# Patient Record
Sex: MALE | Race: WHITE
[De-identification: names, ages, dates, MRNs, and addresses within clinical notes are randomized per-mention and may not be internally consistent; named-entity substitution may affect disease eponyms.]

---

## 2018-10-16 ENCOUNTER — HOSPITAL ENCOUNTER (EMERGENCY)
Dept: HOSPITAL 25 - UCEAST | Age: 83
Discharge: HOME | End: 2018-10-16
Payer: COMMERCIAL

## 2018-10-16 VITALS — SYSTOLIC BLOOD PRESSURE: 131 MMHG | DIASTOLIC BLOOD PRESSURE: 61 MMHG

## 2018-10-16 DIAGNOSIS — E11.21: ICD-10-CM

## 2018-10-16 DIAGNOSIS — Z87.891: ICD-10-CM

## 2018-10-16 DIAGNOSIS — Z79.84: ICD-10-CM

## 2018-10-16 DIAGNOSIS — I25.10: ICD-10-CM

## 2018-10-16 DIAGNOSIS — H61.23: Primary | ICD-10-CM

## 2018-10-16 DIAGNOSIS — Z79.82: ICD-10-CM

## 2018-10-16 PROCEDURE — G0463 HOSPITAL OUTPT CLINIC VISIT: HCPCS

## 2018-10-16 PROCEDURE — 99213 OFFICE O/P EST LOW 20 MIN: CPT

## 2018-10-16 NOTE — UC
Ear Complaint HPI





- HPI Summary


HPI Summary: 


3 DAYS OF DECREASED HEARING OUT OF RIGHT EAR. NO FEVER OR URI SX. WENT TO [facility] 

[facility] AND WAS TOLD HE HAD CERUMEN BUILD UP BUT THEY WERE UNABLE TO REMOVE IT. 





- History of Current Complaint


Chief Complaint: UCEar


Stated Complaint: DIFFICULTY HEARING


Time Seen by Provider: 10/16/18 08:26


Hx Obtained From: Patient


Onset/Duration: Gradual Onset, Lasting Days, Still Present


Severity Initially: Moderate


Severity Currently: Moderate


Pain Intensity: 0


Pain Scale Used: 0-10 Numeric


Aggravating Factors: Nothing


Alleviating Factors: Nothing


Associated Signs/Symptoms: Positive: Hearing Loss.  Negative: Discharge, URI 

Symptoms





- Allergies/Home Medications


Allergies/Adverse Reactions: 


 Allergies











Allergy/AdvReac Type Severity Reaction Status Date / Time


 


No Known Allergies Allergy   Verified 10/16/18 08:23











Home Medications: 


 Home Medications





Aspirin 325 mg PO DAILY WITH MEAL 10/16/18 [History Confirmed 10/16/18]


Tamsulosin HCl [Flomax] 0.4 mg PO DAILY WITH MEAL 10/16/18 [History Confirmed 10

/16/18]


glipiZIDE [Glipizide] 5 mg PO DAILY WITH MEAL 10/16/18 [History Confirmed 10/16/

18]











PMH/Surg Hx/FS Hx/Imm Hx


Endocrine History: Diabetes


Cardiovascular History: Cardiac Disease





- Surgical History


Surgical History: Yes


Surgery Procedure, Year, and Place: stents





- Family History


Known Family History: 


   Negative: Hypertension





- Social History


Alcohol Use: Rare


Substance Use Type: None


Smoking Status (MU): Former Smoker


When Did the Patient Quit Smoking/Using Tobacco: 10 years





Review of Systems


Constitutional: Negative


ENT: Other - HEARING LOSS RIGHT EAR


Respiratory: Negative


Cardiovascular: Negative


Gastrointestinal: Negative


All Other Systems Reviewed And Are Negative: Yes





Physical Exam


Triage Information Reviewed: Yes


Appearance: Well-Appearing, No Pain Distress, Well-Nourished


Vital Signs: 


 Initial Vital Signs











Temp  97.9 F   10/16/18 08:17


 


Pulse  87   10/16/18 08:17


 


Resp  20   10/16/18 08:17


 


BP  131/61   10/16/18 08:17


 


Pulse Ox  96   10/16/18 08:17











Vital Signs Reviewed: Yes


Eyes: Positive: Conjunctiva Clear


ENT: Positive: TMs normal, Other - BILATERAL EAC WITH CERUMEN IMPACTION


Neck: Positive: Supple


Respiratory: Positive: No respiratory distress, No accessory muscle use


Cardiovascular: Positive: Pulses Normal


Abdomen Description: Positive: Soft


Musculoskeletal: Positive: No Edema


Neurological: Positive: Alert


Psychological: Positive: Age Appropriate Behavior


Skin: Negative: rashes





Ear Complaint Course/Dx





- Course


Course Of Treatment: BILATERAL EAC IRRIGATED SUCCESSFULLY BY RN. TMs CLEAR. PT 

STATES HIS HEARING IS RESTORED.





- Differential Dx/Diagnosis


Provider Diagnoses: BILATERAL CERUMEN IMPACTION





Discharge





- Sign-Out/Discharge


Documenting (check all that apply): Patient Departure


All imaging exams completed and their final reports reviewed: No Studies





- Discharge Plan


Condition: Stable


Disposition: HOME


Patient Education Materials:  Cerumen Impaction (ED)


Referrals: 


Humble Lind MD [Primary Care Provider] - If Needed


Additional Instructions: 


NOW THAT YOUR EAR CANALS ARE CLEAR OF WAX YOU MAY USE A QTIP TO GENTLY AND 

CAREFULLY CLEAN YOUR EARS ONCE OR TWICE A WEEK TO KEEP WAX FROM BUILDING UP. DO 

NOT INSERT THE QTIP ANY FURTHER THAN THE DEPTH OF THE COTTON SWAB.





CONSIDER ENT EVALUATION FOR YOUR HEARING LOSS.





[facility] IN [place]


DR[address] [facility] [address]


[address] [facility]


[phone]





- Billing Disposition and Condition


Condition: STABLE


Disposition: Home

## 2018-11-30 ENCOUNTER — HOSPITAL ENCOUNTER (INPATIENT)
Dept: HOSPITAL 25 - ED | Age: 83
LOS: 5 days | Discharge: HOME HEALTH | DRG: 871 | End: 2018-12-05
Attending: INTERNAL MEDICINE | Admitting: HOSPITALIST
Payer: MEDICARE

## 2018-11-30 DIAGNOSIS — I50.21: ICD-10-CM

## 2018-11-30 DIAGNOSIS — Z95.5: ICD-10-CM

## 2018-11-30 DIAGNOSIS — Z82.49: ICD-10-CM

## 2018-11-30 DIAGNOSIS — Z80.9: ICD-10-CM

## 2018-11-30 DIAGNOSIS — A41.9: Primary | ICD-10-CM

## 2018-11-30 DIAGNOSIS — J18.1: ICD-10-CM

## 2018-11-30 DIAGNOSIS — I13.0: ICD-10-CM

## 2018-11-30 DIAGNOSIS — Z66: ICD-10-CM

## 2018-11-30 DIAGNOSIS — N40.0: ICD-10-CM

## 2018-11-30 DIAGNOSIS — N18.9: ICD-10-CM

## 2018-11-30 DIAGNOSIS — N17.9: ICD-10-CM

## 2018-11-30 DIAGNOSIS — I25.2: ICD-10-CM

## 2018-11-30 DIAGNOSIS — I25.10: ICD-10-CM

## 2018-11-30 DIAGNOSIS — J96.01: ICD-10-CM

## 2018-11-30 DIAGNOSIS — I35.0: ICD-10-CM

## 2018-11-30 DIAGNOSIS — Z79.82: ICD-10-CM

## 2018-11-30 DIAGNOSIS — Z83.3: ICD-10-CM

## 2018-11-30 DIAGNOSIS — E11.9: ICD-10-CM

## 2018-11-30 DIAGNOSIS — E78.5: ICD-10-CM

## 2018-11-30 DIAGNOSIS — I27.20: ICD-10-CM

## 2018-11-30 DIAGNOSIS — Z79.84: ICD-10-CM

## 2018-11-30 DIAGNOSIS — I24.8: ICD-10-CM

## 2018-11-30 DIAGNOSIS — Z87.891: ICD-10-CM

## 2018-11-30 DIAGNOSIS — E83.42: ICD-10-CM

## 2018-11-30 LAB
BASOPHILS # BLD AUTO: 0 10^3/UL (ref 0–0.2)
EOSINOPHIL # BLD AUTO: 0 10^3/UL (ref 0–0.6)
HCT VFR BLD AUTO: 35 % (ref 42–52)
HGB BLD-MCNC: 11.7 G/DL (ref 14–18)
INR PPP/BLD: 1.16 (ref 0.77–1.02)
LYMPHOCYTES # BLD AUTO: 0.9 10^3/UL (ref 1–4.8)
MCH RBC QN AUTO: 31 PG (ref 27–31)
MCHC RBC AUTO-ENTMCNC: 34 G/DL (ref 31–36)
MCV RBC AUTO: 92 FL (ref 80–94)
MONOCYTES # BLD AUTO: 1.2 10^3/UL (ref 0–0.8)
NEUTROPHILS # BLD AUTO: 11.1 10^3/UL (ref 1.5–7.7)
NRBC # BLD AUTO: 0 10^3/UL
NRBC BLD QL AUTO: 0
PLATELET # BLD AUTO: 288 10^3/UL (ref 150–450)
RBC # BLD AUTO: 3.76 10^6/UL (ref 4–5.4)
WBC # BLD AUTO: 13.3 10^3/UL (ref 3.5–10.8)

## 2018-11-30 PROCEDURE — 83880 ASSAY OF NATRIURETIC PEPTIDE: CPT

## 2018-11-30 PROCEDURE — 93005 ELECTROCARDIOGRAM TRACING: CPT

## 2018-11-30 PROCEDURE — 85730 THROMBOPLASTIN TIME PARTIAL: CPT

## 2018-11-30 PROCEDURE — 80053 COMPREHEN METABOLIC PANEL: CPT

## 2018-11-30 PROCEDURE — 81015 MICROSCOPIC EXAM OF URINE: CPT

## 2018-11-30 PROCEDURE — 81003 URINALYSIS AUTO W/O SCOPE: CPT

## 2018-11-30 PROCEDURE — 80061 LIPID PANEL: CPT

## 2018-11-30 PROCEDURE — 84484 ASSAY OF TROPONIN QUANT: CPT

## 2018-11-30 PROCEDURE — 87899 AGENT NOS ASSAY W/OPTIC: CPT

## 2018-11-30 PROCEDURE — 36415 COLL VENOUS BLD VENIPUNCTURE: CPT

## 2018-11-30 PROCEDURE — 83735 ASSAY OF MAGNESIUM: CPT

## 2018-11-30 PROCEDURE — 71046 X-RAY EXAM CHEST 2 VIEWS: CPT

## 2018-11-30 PROCEDURE — 85025 COMPLETE CBC W/AUTO DIFF WBC: CPT

## 2018-11-30 PROCEDURE — 80048 BASIC METABOLIC PNL TOTAL CA: CPT

## 2018-11-30 PROCEDURE — 87086 URINE CULTURE/COLONY COUNT: CPT

## 2018-11-30 PROCEDURE — 93306 TTE W/DOPPLER COMPLETE: CPT

## 2018-11-30 PROCEDURE — 85610 PROTHROMBIN TIME: CPT

## 2018-11-30 PROCEDURE — 87040 BLOOD CULTURE FOR BACTERIA: CPT

## 2018-11-30 PROCEDURE — 99284 EMERGENCY DEPT VISIT MOD MDM: CPT

## 2018-11-30 PROCEDURE — 83605 ASSAY OF LACTIC ACID: CPT

## 2018-11-30 PROCEDURE — 83036 HEMOGLOBIN GLYCOSYLATED A1C: CPT

## 2018-11-30 PROCEDURE — 71045 X-RAY EXAM CHEST 1 VIEW: CPT

## 2018-11-30 RX ADMIN — ATORVASTATIN CALCIUM SCH MG: 10 TABLET, FILM COATED ORAL at 21:05

## 2018-11-30 RX ADMIN — INSULIN LISPRO SCH: 100 INJECTION, SOLUTION INTRAVENOUS; SUBCUTANEOUS at 18:09

## 2018-11-30 RX ADMIN — HEPARIN SODIUM SCH UNITS: 5000 INJECTION INTRAVENOUS; SUBCUTANEOUS at 21:05

## 2018-11-30 NOTE — HP
CC:  Dr. Lind.*

 

HISTORY AND PHYSICAL:

 

DATE OF ADMISSION:  11/30/18

 

PROVIDER:  Deborah Gallo NP

 

PRIMARY CARE PROVIDER:  Dr. Humble Lind.

 

ATTENDING PHYSICIAN WHILE IN THE HOSPITAL:  Dr. Veronica Myers * (dictated by 
Deborah Gallo NP).

 

CHIEF COMPLAINT:

1.  Chest pain.

2.  Fever.

 

HISTORY OF PRESENT ILLNESS:  Mr. Langston is an 86-year-old male with a past 
medical history significant for diabetes, hypertension, hyperlipidemia, MI with 
cardiac stent placement, enlarged prostate, who presented to the emergency room 
with complaints of left-sided chest pain that started yesterday.  The patient 
reports that he had chest pain all night and continued today, so he presented 
to the emergency room for further evaluation.  The patient also reports that he 
has had fevers yesterday, but did not check his temperature.  He does report 
that he has a chronic cough and has been taking guaifenesin for his cough.  He 
reports that he has had this cough for some time.  He states this morning the 
chest pain remained on the left side.  It was sharp.  He denied any nausea, 
vomiting, or diaphoresis with the chest pain.  He does report a decreased 
appetite x2 days.  He says since being in the emergency room the chest pain has 
resolved.  He also reports that he had some abdominal pain this morning as well 
in the mid abdomen that has also resolved.  He does report urinary incontinence 
and frequency, but denies any burning with urination or hematuria. He denies 
any swelling. Denies any focal weakness or sensory loss.  Denies any dysphagia.
  Denies any arthralgias or myalgias.  No rashes or lesions.  Denies any 
anxiety or depression.

 

While in the emergency room, the patient had routine lab work drawn.  He had an 
elevated troponin at 0.12.  He was found to have leukocytosis with WBCs of 
13.3. He had a chest x-ray, left mid lung consolidation, which is felt to be 
pneumonia. Given his fever, leukocytosis, tachycardia, hypotension, we were 
asked to see and evaluate the patient for admission.

 

PAST MEDICAL HISTORY:  Significant for:

1.  Diabetes.

2.  Hypertension.

3.  Hyperlipidemia.

4.  History of an MI with 4 cardiac stent placement.

5.  Enlarged prostate.

 

PAST SURGICAL HISTORY:  Cardiac stents.

 

HOME MEDICATIONS:

1.  Nitroglycerin 0.4 mg sublingual q.5 minutes as needed.

2.  Folic acid 1 mg p.o. daily.

3.  Pravastatin 40 mg p.o. at bedtime.

4.  Vitamin B12 1000 mcg p.o. daily.

5.  Glipizide 5 mg p.o. q.a.m.

6.  Tamsulosin 0.8 mg p.o. daily.

7.  Aspirin 325 to 650 mg p.o. q.4 hours as needed.

8.  Guaifenesin 400 mg p.o. 4 times daily p.r.n. cough.

9.  Lisinopril 5 mg p.o. daily.

 

ALLERGIES:  No known drug allergies.

 

FAMILY HISTORY:  No reported history of coronary artery disease or diabetes 
within the family.  He had 2 brothers with cancer, unknown type.

 

SOCIAL HISTORY:  The patient reports that he quit smoking cigarettes 
approximately 18 years ago.  Does report occasional alcohol use.  Denies any 
illicit drug use. He is retired.  He lives alone.  He is .  Surrogate 
decision maker in the event he is unable to make his own decisions is his son, 
Zay.  His number is [phone].  The patient wishes to be a DNR/DNI.  The 
MOLST form has been completed and placed on the chart.

 

REVIEW OF SYSTEMS:  The patient is febrile with a T-max of 101.6.  He does 
report chest pain that was left-sided that started yesterday, has now resolved 
since being in the emergency room.  He denies any edema.  Does report decreased 
appetite x2 days.  Reports a chronic cough, no worse than normal.  Denies any 
hemoptysis. Denies shortness of breath, nocturnal dyspnea, or orthopnea.  
Denies any nausea, vomiting, or diarrhea.  He did report some mid abdominal 
pain that since has resolved.  Denies any gross hematuria or dysuria.  No focal 
weakness or sensory loss.  Denies any visual complaints, dysphagia.  Denies any 
arthralgias or myalgias.  No rashes or lesions.  No psychosis or anxiety.  The 
patient does report that he was incontinent of urine and has increased urinary 
frequency.

 

                               PHYSICAL EXAMINATION

 

GENERAL:  At this time, Mr. Langston is sitting on the stretcher in the emergency 
room.  He is alert and oriented.  He does not appear to be in any acute 
distress.

 

VITAL SIGNS:  Temperature is 99.9, heart rate is 95, respirations are 20, O2 
saturation is 93% on 2 L, blood pressure 107/49.

 

HEENT:  Head is atraumatic, normocephalic.  Eyes:  EOMs are intact.  Sclerae 
anicteric and not pale.  Oral mucosa appeared to be moist.

 

NECK:  Supple.

 

LUNGS:  Diminished bilaterally.  No wheezes.  A few scattered rhonchi.

 

CARDIAC:  S1, S2.  Regular rate and rhythm.  No murmurs, rubs, or gallops.

 

ABDOMEN:  Soft and nontender.  Bowel sounds are present x4.

 

EXTREMITIES:  Pulses are +2 bilaterally.  There is no edema.  He is able to 
move all 4 extremities with 5/5 strength.

 

NEUROLOGIC:  He is awake, alert, and oriented x3.  Speech is clear.  Thought 
process is intact.  There are no gross focal deficits.

 

SKIN:  Intact.

 

DIAGNOSTIC STUDIES/LAB DATA:  WBCs were 13.3, RBCs 3.76, hemoglobin 11.7, 
hematocrit 35, platelet count 288, neutrophils were 83.7, lymphs were 6.9, 
absolute neutrophils were 11.1.  INR was 1.16, APTT was 30.4.  Sodium 135, 
potassium 4.7, chloride 103, carbon dioxide was 26, anion gap was 6, BUN was 32
, creatinine 1.93, glucose was 183, lactic acid was 1.1, calcium 9.4, magnesium 
1.7.  T-bili 1.10, ASTs were 12, ALTs were 11, alkaline phosphatase 101. 
Troponin was 0.12, BNP was 385.  He had a flu A and B, which were negative.

 

He had a chest x-ray.  Radiologist's impression:  Left mid lung consolidation, 
recommend followup until resolution to exclude underlying pulmonary parenchymal 
pathology.  If the clinical presentation is not consistent pneumonia, consider 
further evaluation with a CT of the chest.

 

He had an EKG that showed sinus rhythm with PACs at a rate of 101.

 

ASSESSMENT AND PLAN:  Mr. Langston is an 86-year-old male, who presented to the 
emergency room today with complaints of left-sided chest pain.  He was 
evaluated and found to have pneumonia, meeting sepsis criteria.  We were asked 
to evaluate him for admission.  He will be admitted inpatient for:

 

1.  Sepsis.  He meets sepsis criteria with fever, tachycardia, hypotension, 
leukocytosis, acute kidney injury with known source of infection, pneumonia.  
He did receive 2 L of IV fluids in the emergency room.  He does have an 
elevated BNP. He did receive azithromycin and ceftriaxone in the emergency 
room.  His blood cultures are pending.  UA has been ordered and pending.  A 
urine for Legionella and S. pneumoniae is pending.  His lactic acid was 
negative.  We will continue to monitor him and treat with IV antibiotics.

2.  Pneumonia.  I will continue azithromycin and ceftriaxone.  We will provide 
oxygen support as needed.  Blood cultures are currently pending.  Urine for 
Legionella and Strep pneumoniae currently pending.  A sputum culture has been 
ordered.

3.  Elevated troponin.  The patient did have chest pain.  This started last 
p.m. It has since subsided with IV hydration and aspirin and 1 nitro.  We will 
continue to trend his troponins.  His initial troponin was 0.12.  The patient 
did receive aspirin 324 in the ambulance.  He is already on statin therapy.  We 
will continue his statin therapy.  The patient at this time does not want any 
further evaluation of his elevated troponin.  He does not want a stress test if 
that would be needed. I suspect that his elevation in troponin could be related 
to demand ischemia given his underlying pneumonia, but given his history of 
coronary artery disease with stent placement, acute coronary syndrome is also 
within the differential.  He does have flat T waves in v5 and v6 and q waves  
on his current EKG which showed sinus rhythm with PACs.  There is no old EKG to 
compare, will repeat Ekg in the AM or with further episodes of chest pain.  I 
will hold on beta blocker at this time do to sepsis. Will continue to monitor 
on telemetry. 

4.  Acute kidney injury.  The patient has an elevated BUN and creatinine at 32 
and 1.93.  I suspect this could be related to dehydration as the patient has 
had poor p.o. intake for 2 days.  He received IV hydration in the emergency 
room.  We will repeat a BMP in the a.m. and continue to monitor this.

5.  Hypomagnesemia.  The patient's magnesium was 1.7 on arrival.  I ordered 
magnesium 2 g IV.  We will repeat a mag level in the a.m.

6.  Hypertension.  The patient is currently on lisinopril.  I am going to hold 
his lisinopril due to his acute kidney injury.  We will monitor his blood 
pressure and treat as needed.

7.  Hyperlipidemia.  We will continue on his statin therapy as previous 
prescribed.

8.  Diabetes.  I will do Accu-Cheks a.c. with lispro sliding scale.  I am going 
to hold his glipizide at this time.

9.  FEN:  He can have heart-healthy, decaf okay diet.

10.  Code status:  He is a DNR/DNI. MOLST completed

11.  DVT prophylaxis:  I will place him on heparin subcu.

12.  Disposition:  He will be placed inpatient on telemetry. 

 

TIME SPENT:  Time spent on this admission was greater than 60 minutes, half 
that time was spent face-to-face with the patient and his family obtaining my 
history and physical, the other half of the time was spent going over my plan 
of care and implementing my plan of care.

 

I have discussed this with my attending, Dr. Veronica Myers, and she is in 
agreement with my plan.

 

 ____________________________________ DEBORAH GALLO, NP

 

[id number] #: [id number]

SAAD

## 2018-11-30 NOTE — ED
HPI Chest Pain





- HPI Summary


HPI Summary: 





An 87 y/o male brought in by [facility] ambulance presents to [facility] with a chief 

complaint of chest pain since 11/29/18. The patient claims that he is not 

currently having CP and rates his pain as 0/10 but states that his chest pain 

was continuous. He was given 4 NTG in the ambulance and took NTG at home prior 

to the ambulance but does not believe his CP was relieved because of NTG. He 

also c/o abd pain and productive cough. He has a Hx of MI and has had "4-5 

stents". He denies a Hx of DM, HTN or blood clots in lungs or legs. He also 

suffered from urinary incontinence the night of 11/2918. He reports not wearing 

O2 at home.





- History of Current Complaint


Chief Complaint: EDChestPainROMI


Time Seen by Provider: 11/30/18 11:58


Hx Obtained From: Patient, Family/Caretaker, EMS


Onset/Duration: Started Hours Ago, Resolved


Timing: Constant


Initial Severity: Moderate


Current Severity: Mild


Pain Intensity: 0


Pain Scale Used: 0-10 Numeric


Chest Pain Location: Diffuse


Chest Pain Radiates: No





- Allergy/Home Medications


Allergies/Adverse Reactions: 


 Allergies











Allergy/AdvReac Type Severity Reaction Status Date / Time


 


No Known Allergies Allergy   Verified 10/16/18 08:23











Home Medications: 


 Home Medications





Aspirin [Ecotrin] 325 - 650 mg PO Q4HR PRN 11/30/18 [History Confirmed 11/30/18]


Cyanocobalamin TAB* [Vitamin B12 TAB*] 1,000 mcg PO DAILY 11/30/18 [History 

Confirmed 11/30/18]


Folic Acid TAB* [Folvite TAB*] 1 mg PO DAILY 11/30/18 [History Confirmed 11/30/ 18]


Lisinopril TAB* [Prinivil TAB*] 5 mg PO DAILY 11/30/18 [History Confirmed 11/30/ 18]


Nitroglycerin TAB 0.4 MG* 0.4 mg SL Q5M PRN 11/30/18 [History Confirmed 11/30/18

]


Pravastatin (NF) [Pravachol (NF)] 40 mg PO BEDTIME 11/30/18 [History Confirmed 

11/30/18]


Tamsulosin CAP* [Flomax CAP*] 0.8 mg PO DAILY 11/30/18 [History Confirmed 11/30/ 18]


glipiZIDE TAB* [Glucotrol TAB*] 5 mg PO QAM 11/30/18 [History Confirmed 11/30/18

]


guaiFENesin [Mucus Relief] 400 mg PO QID PRN 11/30/18 [History Confirmed 11/30/ 18]











PMH/Surg Hx/FS Hx/Imm Hx


Endocrine/Hematology History: Reports: Hx Diabetes - typeII


   Denies: Hx Thyroid Disease


Cardiovascular History: 


   Denies: Hx Hypertension


Respiratory History: 


   Denies: Hx Asthma


GI History: 


   Denies: Hx Ulcer





- Surgical History


Surgery Procedure, Year, and Place: stents


Infectious Disease History: No


Infectious Disease History: 


   Denies: Hx Hepatitis, Hx Human Immunodeficiency Virus (HIV), Traveled 

Outside the [place] in Last 30 Days





- Family History


Known Family History: 


   Negative: Hypertension





- Social History


Alcohol Use: Rare


Substance Use Type: Reports: None


Hx Tobacco Use: Yes


Smoking Status (MU): Former Smoker





Review of Systems


Cardiovascular: Negative - blood clots in lungs or legs


Positive: Chest Pain


Positive: Abdominal Pain


Positive: incontinence


All Other Systems Reviewed And Are Negative: Yes





Physical Exam





- Summary


Physical Exam Summary: 





GENERAL: Patient is a well-developed and nourished F who is lying comfortable 

in the stretcher. Patient is not in any acute respiratory distress.


HEAD AND FACE: Normocephalic


EYES: PERRLA, EOMI x 2.


EARS: Hearing grossly intact.


MOUTH: Oropharynx within normal limits.


NECK: Supple, trachea is midline, no adenopathy, no JVD, no carotid bruit.


CHEST: Symmetric, no tenderness at palpation


LUNGS: Wheezing in left anterior, decreased breath sounds in bases bilaterally.


CVS: Regular rate and rhythm, S1 and S2 present, no murmurs or gallops 

appreciated.


ABDOMEN: Soft, non-tender. Bowel sounds are normal. No abdominal abnormal 

pulsations.


EXTREMITIES: Full ROM in all major joints, no edema, no cyanosis or clubbing.


NEURO: Alert and oriented x 3. No acute neurological deficits. Speech is normal 

and follows commands.


SKIN: Dry and warm


Triage Information Reviewed: Yes


Vital Signs On Initial Exam: 


 Initial Vitals











Temp Pulse Resp BP Pulse Ox


 


 101.6 F   103   17   112/51   96 


 


 11/30/18 11:54  11/30/18 11:54  11/30/18 11:54  11/30/18 11:54  11/30/18 11:54











Vital Signs Reviewed: Yes





Diagnostics





- Vital Signs


 Vital Signs











  Temp Pulse Resp BP Pulse Ox


 


 11/30/18 11:54  101.6 F  103  17  112/51  96














- Laboratory


Result Diagrams: 


 12/01/18 06:01





 12/01/18 06:01


Lab Statement: Any lab studies that have been ordered have been reviewed, and 

results considered in the medical decision making process.





- Radiology


  ** CXR


Radiology Interpretation Completed By: Radiologist


Summary of Radiographic Findings: LEFT MIDLUNG CONSOLIDATION. RECOMMEND FOLLOW-

UP UNTIL RESOLUTION TO EXCLUDE.  UNDERLYING PULMONARY PARENCHYMAL PATHOLOGY. IF 

THE CLINICAL PRESENTATION IS NOT CONSISTENT.  WITH PNEUMONIA, CONSIDER FURTHER 

EVALUATION WITH CT OF THE CHEST.  ED physician has reviewed this imaging report.





- EKG


  ** 12:24


Cardiac Rate: Tachycardia - 101 bpm


EKG Rhythm: Sinus Tachycardia


Summary of EKG Findings: sinus arrhythmia, some flattening of T-waves in 

lateral leads.





Re-Evaluation





- Re-Evaluation


  ** First Eval


Re-Evaluation Time: 13:35


Change: Unchanged


Comment: Convinced patient for admission





Chest Pain Course/Dx





- Course


Course Of Treatment: An 87 y/o male brought in by [facility] ambulance presents to 

[facility] with a chief complaint of chest pain since 11/29/18. Workup is 

remarkable. Patient will be admitted. CXR impression:  LEFT MIDLUNG 

CONSOLIDATION. RECOMMEND FOLLOW-UP UNTIL RESOLUTION TO EXCLUDE.  UNDERLYING 

PULMONARY PARENCHYMAL PATHOLOGY. IF THE CLINICAL PRESENTATION IS NOT 

CONSISTENT.  WITH PNEUMONIA, CONSIDER FURTHER EVALUATION WITH CT OF THE CHEST. 

Dx: PNA, acute kidney injury. Rule out ACS. Case discussed with hospitalist. I 

discussed results with patient. The patient agrees with this plan.





- Diagnoses


Provider Diagnoses: 


 PNA (pneumonia), Acute kidney injury








- Provider Notifications


Discussed Care Of Patient With: Veronica Myers


Time Discussed With Above Provider: 13:45


Instructed by Provider To: Admit As Inpatient





Discharge





- Sign-Out/Discharge


Documenting (check all that apply): Patient Departure - Admit





- Discharge Plan


Condition: Fair


Disposition: ADMITTED TO [facility]





- Billing Disposition and Condition


Condition: FAIR


Disposition: Admitted to [facility]





- Attestation Statements


Document Initiated by Scribe: Yes


Documenting Scribe: Tonny Sexton


Provider For Whom Scribe is Documenting (Include Credential): MD Ramiro Delatorreibcarlos Attestation: 


Tonny NGUYEN, scribed for Mariela Peña MD on 12/02/18 at 0156. 


Scribe Documentation Reviewed: Yes


Provider Attestation: 


The documentation as recorded by the scribe, Tonny Sexton accurately 

reflects the service I personally performed and the decisions made by me, Deandre Peña MD


Status of Scribe Document: Viewed

## 2018-12-01 LAB
BASOPHILS # BLD AUTO: 0 10^3/UL (ref 0–0.2)
EOSINOPHIL # BLD AUTO: 0 10^3/UL (ref 0–0.6)
HCT VFR BLD AUTO: 33 % (ref 42–52)
HGB BLD-MCNC: 10.9 G/DL (ref 14–18)
LYMPHOCYTES # BLD AUTO: 1 10^3/UL (ref 1–4.8)
MCH RBC QN AUTO: 31 PG (ref 27–31)
MCHC RBC AUTO-ENTMCNC: 33 G/DL (ref 31–36)
MCV RBC AUTO: 92 FL (ref 80–94)
MONOCYTES # BLD AUTO: 1.1 10^3/UL (ref 0–0.8)
NEUTROPHILS # BLD AUTO: 10.3 10^3/UL (ref 1.5–7.7)
NRBC # BLD AUTO: 0 10^3/UL
NRBC BLD QL AUTO: 0.3
PLATELET # BLD AUTO: 282 10^3/UL (ref 150–450)
RBC # BLD AUTO: 3.56 10^6/UL (ref 4–5.4)
RBC UR QL AUTO: (no result)
WBC # BLD AUTO: 12.6 10^3/UL (ref 3.5–10.8)
WBC UR QL AUTO: (no result)

## 2018-12-01 RX ADMIN — METOPROLOL TARTRATE SCH MG: 25 TABLET, FILM COATED ORAL at 20:50

## 2018-12-01 RX ADMIN — INSULIN LISPRO SCH: 100 INJECTION, SOLUTION INTRAVENOUS; SUBCUTANEOUS at 17:25

## 2018-12-01 RX ADMIN — TAMSULOSIN HYDROCHLORIDE SCH MG: 0.4 CAPSULE ORAL at 09:37

## 2018-12-01 RX ADMIN — CEFTRIAXONE SODIUM SCH MLS/HR: 1 INJECTION, POWDER, FOR SOLUTION INTRAVENOUS at 14:59

## 2018-12-01 RX ADMIN — CYANOCOBALAMIN TAB 500 MCG SCH MCG: 500 TAB at 09:38

## 2018-12-01 RX ADMIN — INSULIN LISPRO SCH: 100 INJECTION, SOLUTION INTRAVENOUS; SUBCUTANEOUS at 14:13

## 2018-12-01 RX ADMIN — INSULIN LISPRO SCH: 100 INJECTION, SOLUTION INTRAVENOUS; SUBCUTANEOUS at 09:09

## 2018-12-01 RX ADMIN — HEPARIN SODIUM SCH UNITS: 5000 INJECTION INTRAVENOUS; SUBCUTANEOUS at 14:59

## 2018-12-01 RX ADMIN — HEPARIN SODIUM SCH UNITS: 5000 INJECTION INTRAVENOUS; SUBCUTANEOUS at 05:50

## 2018-12-01 RX ADMIN — ASPIRIN SCH MG: 81 TABLET, COATED ORAL at 09:37

## 2018-12-01 RX ADMIN — ATORVASTATIN CALCIUM SCH MG: 10 TABLET, FILM COATED ORAL at 20:50

## 2018-12-01 RX ADMIN — FOLIC ACID SCH MG: 1 TABLET ORAL at 09:38

## 2018-12-01 RX ADMIN — HEPARIN SODIUM SCH UNITS: 5000 INJECTION INTRAVENOUS; SUBCUTANEOUS at 20:50

## 2018-12-01 NOTE — PN
Subjective


Date of Service: 12/01/18


Interval History: 








Pt attest to chronic SOUZA and chest pains likely angina related but says he is 

87 yo and still wants to limit/refuse any additional cardiac workup such as a 

stress test. Denies current cardiologist. Says an exercise stress test after 

his 2002 Stents with Dr. Mo was somewhat traumatic. Even explaining nuclear 

stress or ECHO would be less invasive he refuses. Walks 200 yds to his mail box 

and back without having to stop. 


Sees Dr. Lind every 6 months. Thinks he may have mentioned some degree of 

kidney disease. 


Still on 4L 


Tmax 100.0 this AM 


Sees no specialist














Objective


Active Medications: 








Acetaminophen (Tylenol Tab*)  650 mg PO Q4H PRN


   PRN Reason: FEVER/PAIN


Al Hydrox/Mg Hydrox/Simethicone (Maalox Plus*)  30 ml PO Q6H PRN


   PRN Reason: INDIGESTION


Albuterol (Ventolin 2.5 Mg/3 Ml Neb.Sol*)  2.5 mg INH [id number] AWAKE PRN


   PRN Reason: sob/wheezing


Aspirin (Aspirin Ec Tab*)  81 mg PO DAILY [facility]


   Last Admin: 12/01/18 09:37 Dose:  81 mg


Atorvastatin Calcium (Lipitor*)  10 mg PO BEDTIME [facility]; Protocol


   Last Admin: 11/30/18 21:05 Dose:  10 mg


Cyanocobalamin (Vitamin B12 Tab*)  1,000 mcg PO DAILY [facility]


   Last Admin: 12/01/18 09:38 Dose:  1,000 mcg


Dextrose (D50w Syringe 50 Ml*)  12.5 gm IV PUSH .FOR FS < 60 - SS PRN


   PRN Reason: FS < 60


Folic Acid (Folvite Tab*)  1 mg PO DAILY [facility]


   Last Admin: 12/01/18 09:38 Dose:  1 mg


Guaifenesin (Robitussin*)  20 ml PO QID PRN


   PRN Reason: CONGESTION


Heparin Sodium (Porcine) (Heparin Vial(*))  5,000 units SUBCUT Q8HR [facility]


   Last Admin: 12/01/18 14:59 Dose:  5,000 units


Ceftriaxone Sodium 1 gm/ (Sodium Chloride)  50 mls @ 200 mls/hr IVPB Q24H [facility]


   Last Admin: 12/01/18 14:59 Dose:  200 mls/hr


Azithromycin 500 mg/ Sodium (Chloride)  250 mls @ 250 mls/hr IVPB Q24H [facility]


   Last Admin: 12/01/18 16:27 Dose:  250 mls/hr


Insulin Human Lispro (Humalog*)  0 units SUBCUT AC [facility]; Protocol


   Last Admin: 12/01/18 14:13 Dose:  Not Given


Nitroglycerin (Nitroglycerin Tab 0.4 Mg*)  0.4 mg SL Q5M PRN


   PRN Reason: PAIN - CHEST


Tamsulosin HCl (Flomax Cap*)  0.8 mg PO DAILY [facility]


   Last Admin: 12/01/18 09:37 Dose:  0.8 mg








 Vital Signs - 8 hr











  12/01/18 12/01/18





  11:21 16:13


 


Temperature 100.0 F 98.6 F


 


Pulse Rate 105 104


 


Respiratory 22 20





Rate  


 


Blood Pressure 133/70 122/49





(mmHg)  


 


O2 Sat by Pulse 95 100





Oximetry  











Oxygen Devices in Use Now: Nasal Cannula


Appearance: NAD


Eyes: No Scleral Icterus, PERRLA


Ears/Nose/Mouth/Throat: NL Teeth, Lips, Gums, Mucous Membranes Moist


Neck: NL Appearance and Movements; NL JVP


Respiratory: - - rhonchi in left mid lung field, no wheezing or rales. 


Cardiovascular: - - holosystolic murmur 2/6 loudest at LLSB, regular rate, no 

rubs or gallops


Extremities: No Edema


Skin: No Rash or Ulcers, No Nodules or Sclerosis


Neurological: Alert and Oriented x 3, NL Sensation, NL Muscle Strength and Tone


Nutrition: Taking PO's


Result Diagrams: 


 12/01/18 06:01





 12/01/18 06:01


Additional Lab and Data: 





 Laboratory Results - last 24 hr











  11/30/18 12/01/18 12/01/18





  18:50 01:55 06:01


 


WBC    12.6 H


 


RBC    3.56 L


 


Hgb    10.9 L


 


Hct    33 L


 


MCV    92


 


MCH    31


 


MCHC    33


 


RDW    14


 


Plt Count    282


 


MPV    6.8 L


 


Neut % (Auto)    82.2


 


Lymph % (Auto)    8.3


 


Mono % (Auto)    9.1


 


Eos % (Auto)    0


 


Baso % (Auto)    0.4


 


Absolute Neuts (auto)    10.3 H


 


Absolute Lymphs (auto)    1.0


 


Absolute Monos (auto)    1.1 H


 


Absolute Eos (auto)    0


 


Absolute Basos (auto)    0


 


Absolute Nucleated RBC    0


 


Nucleated RBC %    0.3


 


Sodium   


 


Potassium   


 


Chloride   


 


Carbon Dioxide   


 


Anion Gap   


 


BUN   


 


Creatinine   


 


Est GFR ( Amer)   


 


Est GFR (Non-Af Amer)   


 


BUN/Creatinine Ratio   


 


Glucose   


 


POC Glucose (mg/dL)   


 


Calcium   


 


Magnesium   


 


Troponin I  0.09 H*  


 


Triglycerides   


 


Cholesterol   


 


LDL Cholesterol   


 


HDL Cholesterol   


 


Urine Color   Yellow 


 


Urine Appearance   Cloudy 


 


Urine pH   5.0 


 


Ur Specific Gravity   1.014 


 


Urine Protein   Negative 


 


Urine Ketones   Trace A 


 


Urine Blood   2+ A 


 


Urine Nitrate   Negative 


 


Urine Bilirubin   Negative 


 


Urine Urobilinogen   Negative 


 


Ur Leukocyte Esterase   Trace A 


 


Urine WBC (Auto)   2+(11-20/hpf) A 


 


Urine RBC (Auto)   2+(6-10/hpf) A 


 


Ur Squamous Epith Cells   Present A 


 


Urine Bacteria   Absent 


 


Urine Glucose   Negative 


 


Urine Ascorbic Acid   * A 














  12/01/18 12/01/18 12/01/18





  06:01 07:28 11:35


 


WBC   


 


RBC   


 


Hgb   


 


Hct   


 


MCV   


 


MCH   


 


MCHC   


 


RDW   


 


Plt Count   


 


MPV   


 


Neut % (Auto)   


 


Lymph % (Auto)   


 


Mono % (Auto)   


 


Eos % (Auto)   


 


Baso % (Auto)   


 


Absolute Neuts (auto)   


 


Absolute Lymphs (auto)   


 


Absolute Monos (auto)   


 


Absolute Eos (auto)   


 


Absolute Basos (auto)   


 


Absolute Nucleated RBC   


 


Nucleated RBC %   


 


Sodium  135  


 


Potassium  4.7  


 


Chloride  104  


 


Carbon Dioxide  23  


 


Anion Gap  8  


 


BUN  33 H  


 


Creatinine  1.76 H  


 


Est GFR ( Amer)  44.6  


 


Est GFR (Non-Af Amer)  36.9  


 


BUN/Creatinine Ratio  18.8  


 


Glucose  179 H  


 


POC Glucose (mg/dL)   172 H  202 H


 


Calcium  8.8  


 


Magnesium  2.0  


 


Troponin I   


 


Triglycerides  138  


 


Cholesterol  132  


 


LDL Cholesterol  71  


 


HDL Cholesterol  33.4  


 


Urine Color   


 


Urine Appearance   


 


Urine pH   


 


Ur Specific Gravity   


 


Urine Protein   


 


Urine Ketones   


 


Urine Blood   


 


Urine Nitrate   


 


Urine Bilirubin   


 


Urine Urobilinogen   


 


Ur Leukocyte Esterase   


 


Urine WBC (Auto)   


 


Urine RBC (Auto)   


 


Ur Squamous Epith Cells   


 


Urine Bacteria   


 


Urine Glucose   


 


Urine Ascorbic Acid   














  12/01/18





  16:32


 


WBC 


 


RBC 


 


Hgb 


 


Hct 


 


MCV 


 


MCH 


 


MCHC 


 


RDW 


 


Plt Count 


 


MPV 


 


Neut % (Auto) 


 


Lymph % (Auto) 


 


Mono % (Auto) 


 


Eos % (Auto) 


 


Baso % (Auto) 


 


Absolute Neuts (auto) 


 


Absolute Lymphs (auto) 


 


Absolute Monos (auto) 


 


Absolute Eos (auto) 


 


Absolute Basos (auto) 


 


Absolute Nucleated RBC 


 


Nucleated RBC % 


 


Sodium 


 


Potassium 


 


Chloride 


 


Carbon Dioxide 


 


Anion Gap 


 


BUN 


 


Creatinine 


 


Est GFR ( Amer) 


 


Est GFR (Non-Af Amer) 


 


BUN/Creatinine Ratio 


 


Glucose 


 


POC Glucose (mg/dL)  205 H


 


Calcium 


 


Magnesium 


 


Troponin I 


 


Triglycerides 


 


Cholesterol 


 


LDL Cholesterol 


 


HDL Cholesterol 


 


Urine Color 


 


Urine Appearance 


 


Urine pH 


 


Ur Specific Gravity 


 


Urine Protein 


 


Urine Ketones 


 


Urine Blood 


 


Urine Nitrate 


 


Urine Bilirubin 


 


Urine Urobilinogen 


 


Ur Leukocyte Esterase 


 


Urine WBC (Auto) 


 


Urine RBC (Auto) 


 


Ur Squamous Epith Cells 


 


Urine Bacteria 


 


Urine Glucose 


 


Urine Ascorbic Acid 











Microbiology and Other Data: 











 Microbiology





11/30/18 12:45   Blood Venous   Aerobic Blood Culture - Preliminary


                            No Growth Day 1


11/30/18 12:45   Blood Venous   Anaerobic Blood Culture - Preliminary


                            No Growth Day 1


12/01/18 01:55   Urine   Legionella Urinary Antigen - Final


                            Negative Legionella Antigen


12/01/18 01:55   Urine   Streptococcus pneumoniae Ag Screen - Final


                            Negative S. pneumo Antigen


11/30/18 13:03   Nasal   Influenza Types A,B Antigen - Final


                            Specimen received for Influenza A/B Molecular 

testing

















Assess/Plan/Problems-Billing


Assessment: 





87 yo male PMH CAD with 4 stents with 2002 [facility] documenting subtotal LAD 

occlusion, NIDDM, HTHN, HLD with chronic SOUZA and exertional chest pain but 

refusing additional cardiac workup presenting with left chest pain and 

suprapubic pain. left midlung consolidation, fever, leukocytosis, tachycardia, 

tachypnea suspicious for sepsis secondary to pneumonia. Acute hypoxic 

respiratory failure. CFTX, azithromycin. 





- Patient Problems


(1) Sepsis


Current Visit: Yes   Status: Acute   Comment: fever, tachycardia, leukocytosis, 

tachpynea, acute hypoxic respiratory failure secondary to left middle lobe 

pneumonia. 


fever curve, HR, RR improving, still with significant oxygen requirement. 


   





(2) Pneumonia


Current Visit: Yes   Status: Acute   Code(s): J18.9 - PNEUMONIA, UNSPECIFIED 

ORGANISM   SNOMED Code(s): 543822955


   Comment: Continue ceftriaxone, azithromycin


strep pna and legionella urine Ag negative. 


f/u Bcx, sputm cx


flu negative.    





(3) CAD (coronary artery disease)


Current Visit: Yes   Status: Acute   Code(s): I25.10 - ATHSCL HEART DISEASE OF 

NATIVE CORONARY ARTERY W/O ANG PCTRS   SNOMED Code(s): 21664884


   Comment: reported 4 stents


continue nitro SL prn. 


he is not on a BB at home. will start metoprolol 12.5mg q12 for now given 

resolving sepsis


former smoker (quit 18 years ago)


refusing nuclear stress or ECHO. EKG with inferior Q wave in III and v5 ST 

depressions and TWI II   





(4) HLD (hyperlipidemia)


Current Visit: Yes   Status: Acute   Code(s): E78.5 - HYPERLIPIDEMIA, 

UNSPECIFIED   SNOMED Code(s): 50859957


   Comment: Continue 10mg atorvastatin. (home is pravastatin 40mg)


HDL 71, 


HDL 33   





(5) Kidney disease


Current Visit: Yes   Status: Acute   Comment: Unknown baseline, but likely 

degree of chronic kidney disease. 


CRT improved 1.93 ->1.76 with IVF. 


Plan to obtain records from Dr. Lind if still here on Monday.    


Status and Disposition: 





medicine inpatient.

## 2018-12-02 LAB
BASOPHILS # BLD AUTO: 0 10^3/UL (ref 0–0.2)
EOSINOPHIL # BLD AUTO: 0 10^3/UL (ref 0–0.6)
HCT VFR BLD AUTO: 33 % (ref 42–52)
HGB BLD-MCNC: 11.1 G/DL (ref 14–18)
LYMPHOCYTES # BLD AUTO: 0.7 10^3/UL (ref 1–4.8)
MCH RBC QN AUTO: 31 PG (ref 27–31)
MCHC RBC AUTO-ENTMCNC: 34 G/DL (ref 31–36)
MCV RBC AUTO: 92 FL (ref 80–94)
MONOCYTES # BLD AUTO: 1.1 10^3/UL (ref 0–0.8)
NEUTROPHILS # BLD AUTO: 8.1 10^3/UL (ref 1.5–7.7)
NRBC # BLD AUTO: 0 10^3/UL
NRBC BLD QL AUTO: 0.1
PLATELET # BLD AUTO: 338 10^3/UL (ref 150–450)
RBC # BLD AUTO: 3.61 10^6/UL (ref 4–5.4)
WBC # BLD AUTO: 10 10^3/UL (ref 3.5–10.8)

## 2018-12-02 RX ADMIN — INSULIN LISPRO SCH UNITS: 100 INJECTION, SOLUTION INTRAVENOUS; SUBCUTANEOUS at 13:07

## 2018-12-02 RX ADMIN — HEPARIN SODIUM SCH UNITS: 5000 INJECTION INTRAVENOUS; SUBCUTANEOUS at 13:55

## 2018-12-02 RX ADMIN — INSULIN GLARGINE SCH UNITS: 100 INJECTION, SOLUTION SUBCUTANEOUS at 17:16

## 2018-12-02 RX ADMIN — INSULIN LISPRO SCH UNITS: 100 INJECTION, SOLUTION INTRAVENOUS; SUBCUTANEOUS at 08:41

## 2018-12-02 RX ADMIN — HEPARIN SODIUM SCH UNITS: 5000 INJECTION INTRAVENOUS; SUBCUTANEOUS at 05:59

## 2018-12-02 RX ADMIN — CEFTRIAXONE SODIUM SCH MLS/HR: 1 INJECTION, POWDER, FOR SOLUTION INTRAVENOUS at 13:55

## 2018-12-02 RX ADMIN — FUROSEMIDE SCH MG: 10 INJECTION, SOLUTION INTRAMUSCULAR; INTRAVENOUS at 15:37

## 2018-12-02 RX ADMIN — TAMSULOSIN HYDROCHLORIDE SCH MG: 0.4 CAPSULE ORAL at 08:42

## 2018-12-02 RX ADMIN — FOLIC ACID SCH MG: 1 TABLET ORAL at 08:42

## 2018-12-02 RX ADMIN — METOPROLOL TARTRATE SCH MG: 25 TABLET, FILM COATED ORAL at 08:41

## 2018-12-02 RX ADMIN — INSULIN LISPRO SCH UNITS: 100 INJECTION, SOLUTION INTRAVENOUS; SUBCUTANEOUS at 17:15

## 2018-12-02 RX ADMIN — CYANOCOBALAMIN TAB 500 MCG SCH MCG: 500 TAB at 08:41

## 2018-12-02 RX ADMIN — ATORVASTATIN CALCIUM SCH MG: 80 TABLET, FILM COATED ORAL at 21:12

## 2018-12-02 RX ADMIN — HEPARIN SODIUM SCH UNITS: 5000 INJECTION INTRAVENOUS; SUBCUTANEOUS at 21:12

## 2018-12-02 RX ADMIN — ASPIRIN SCH MG: 81 TABLET, COATED ORAL at 08:42

## 2018-12-02 RX ADMIN — METOPROLOL TARTRATE SCH MG: 25 TABLET, FILM COATED ORAL at 21:09

## 2018-12-02 NOTE — PN
Subjective


Date of Service: 12/02/18


Interval History: 








Family at bedside, questions answered. 


Currently on 5L, 98%


coughing still, unable to produce sputum


feeling more comfortable than last night when had episode of respiratory 

distress, got another 20mg IV lasix. 


weight decreasing. 


ECHO okay per pt and family. 

















Objective


Active Medications: 








Acetaminophen (Tylenol Tab*)  650 mg PO Q4H PRN


   PRN Reason: FEVER/PAIN


Al Hydrox/Mg Hydrox/Simethicone (Maalox Plus*)  30 ml PO Q6H PRN


   PRN Reason: INDIGESTION


Albuterol (Ventolin 2.5 Mg/3 Ml Neb.Sol*)  2.5 mg INH [id number] AWAKE PRN


   PRN Reason: sob/wheezing


Aspirin (Aspirin Ec Tab*)  81 mg PO DAILY [facility]


   Last Admin: 12/02/18 08:42 Dose:  81 mg


Atorvastatin Calcium (Lipitor*)  10 mg PO BEDTIME [facility]; Protocol


   Last Admin: 12/01/18 20:50 Dose:  10 mg


Azithromycin (Zithromax Tab*)  500 mg PO ONCE ONE


   Stop: 12/02/18 17:01


Cyanocobalamin (Vitamin B12 Tab*)  1,000 mcg PO DAILY [facility]


   Last Admin: 12/02/18 08:41 Dose:  1,000 mcg


Dextrose (D50w Syringe 50 Ml*)  12.5 gm IV PUSH .FOR FS < 60 - SS PRN


   PRN Reason: FS < 60


Folic Acid (Folvite Tab*)  1 mg PO DAILY [facility]


   Last Admin: 12/02/18 08:42 Dose:  1 mg


Furosemide (Lasix Tab*)  40 mg PO DAILY [facility]


   Last Admin: 12/02/18 08:42 Dose:  40 mg


Guaifenesin (Robitussin*)  20 ml PO QID PRN


   PRN Reason: CONGESTION


Heparin Sodium (Porcine) (Heparin Vial(*))  5,000 units SUBCUT Q8HR [facility]


   Last Admin: 12/02/18 05:59 Dose:  5,000 units


Ceftriaxone Sodium 1 gm/ (Sodium Chloride)  50 mls @ 200 mls/hr IVPB Q24H [facility]


   Last Admin: 12/01/18 14:59 Dose:  200 mls/hr


Insulin Human Lispro (Humalog*)  0 units SUBCUT AC [facility]; Protocol


   Last Admin: 12/02/18 08:41 Dose:  2 units


Metoprolol Tartrate (Lopressor Tab*)  12.5 mg PO Q12HR [facility]


   Last Admin: 12/02/18 08:41 Dose:  12.5 mg


Nitroglycerin (Nitroglycerin Tab 0.4 Mg*)  0.4 mg SL Q5M PRN


   PRN Reason: PAIN - CHEST


Tamsulosin HCl (Flomax Cap*)  0.8 mg PO DAILY [facility]


   Last Admin: 12/02/18 08:42 Dose:  0.8 mg








 Vital Signs - 8 hr











  12/02/18 12/02/18 12/02/18





  03:28 03:30 07:32


 


Temperature  98.7 F 


 


Pulse Rate  101 


 


Respiratory 20 20 20





Rate   


 


Blood Pressure  111/58 





(mmHg)   


 


O2 Sat by Pulse  98 





Oximetry   














  12/02/18





  08:20


 


Temperature 99.0 F


 


Pulse Rate 99


 


Respiratory 20





Rate 


 


Blood Pressure 110/46





(mmHg) 


 


O2 Sat by Pulse 96





Oximetry 











Oxygen Devices in Use Now: Nasal Cannula


Appearance: NAD


Eyes: No Scleral Icterus


Ears/Nose/Mouth/Throat: NL Teeth, Lips, Gums


Neck: NL Appearance and Movements; NL JVP


Respiratory: Symmetrical Chest Expansion and Respiratory Effort - rhonchorous 

on left, decreased at bases. no wheezing. 


Cardiovascular: NL Sounds; No Murmurs; No JVD, RRR


Abdominal: NL Sounds; No Tenderness; No Distention


Extremities: - - trace edema


Skin: No Rash or Ulcers


Neurological: Alert and Oriented x 3, NL Sensation, NL Muscle Strength and Tone


Nutrition: Taking PO's


Result Diagrams: 


 12/02/18 05:26





 12/02/18 05:26


Additional Lab and Data: 


 Laboratory Results - last 24 hr











  12/02/18 12/02/18 12/02/18





  05:26 05:26 05:26


 


WBC    10.0


 


RBC    3.61 L


 


Hgb    11.1 L


 


Hct    33 L


 


MCV    92


 


MCH    31


 


MCHC    34


 


RDW    14


 


Plt Count    338


 


MPV    6.7 L


 


Neut % (Auto)    81.2


 


Lymph % (Auto)    7.2


 


Mono % (Auto)    11.3


 


Eos % (Auto)    0.1


 


Baso % (Auto)    0.2


 


Absolute Neuts (auto)    8.1 H


 


Absolute Lymphs (auto)    0.7 L


 


Absolute Monos (auto)    1.1 H


 


Absolute Eos (auto)    0


 


Absolute Basos (auto)    0


 


Absolute Nucleated RBC    0


 


Nucleated RBC %    0.1


 


Sodium   137 


 


Potassium   4.8 


 


Chloride   103 


 


Carbon Dioxide   25 


 


Anion Gap   9 


 


BUN   34 H 


 


Creatinine   1.67 H 


 


Est GFR ( Amer)   47.4 


 


Est GFR (Non-Af Amer)   39.2 


 


BUN/Creatinine Ratio   20.4 H 


 


Glucose   220 H 


 


POC Glucose (mg/dL)   


 


Hemoglobin A1c  7.1 H  


 


Calcium   8.8 














  12/02/18 12/02/18 12/02/18





  07:12 11:09 16:14


 


WBC   


 


RBC   


 


Hgb   


 


Hct   


 


MCV   


 


MCH   


 


MCHC   


 


RDW   


 


Plt Count   


 


MPV   


 


Neut % (Auto)   


 


Lymph % (Auto)   


 


Mono % (Auto)   


 


Eos % (Auto)   


 


Baso % (Auto)   


 


Absolute Neuts (auto)   


 


Absolute Lymphs (auto)   


 


Absolute Monos (auto)   


 


Absolute Eos (auto)   


 


Absolute Basos (auto)   


 


Absolute Nucleated RBC   


 


Nucleated RBC %   


 


Sodium   


 


Potassium   


 


Chloride   


 


Carbon Dioxide   


 


Anion Gap   


 


BUN   


 


Creatinine   


 


Est GFR ( Amer)   


 


Est GFR (Non-Af Amer)   


 


BUN/Creatinine Ratio   


 


Glucose   


 


POC Glucose (mg/dL)  222 H  279 H  282 H


 


Hemoglobin A1c   


 


Calcium   














Microbiology and Other Data: 











  Microbiology





11/30/18 12:45   Blood Venous   Aerobic Blood Culture - Preliminary


                            No Growth Day 2


11/30/18 12:45   Blood Venous   Anaerobic Blood Culture - Preliminary


                            No Growth Day 2


12/01/18 01:55   Urine   Urine Culture - Final


12/01/18 01:55   Urine   Legionella Urinary Antigen - Final


                            Negative Legionella Antigen


12/01/18 01:55   Urine   Streptococcus pneumoniae Ag Screen - Final


                            Negative S. pneumo Antigen


11/30/18 13:03   Nasal   Influenza Types A,B Antigen - Final


                            Specimen received for Influenza A/B Molecular 

testing

















Assess/Plan/Problems-Billing


Assessment: 





87 yo male PMH CAD with 4 stents with 2002 [facility] documenting subtotal LAD 

occlusion, NIDDM, HTHN, HLD with chronic SOUZA and exertional chest pain but 

refusing additional cardiac workup presenting with left chest pain and 

suprapubic pain. left midlung consolidation, fever, leukocytosis, tachycardia, 

tachypnea suspicious for sepsis secondary to pneumonia. Acute hypoxic 

respiratory failure. CFTX, azithromycin. ECHO with evidence of multiple 

regional wall motion abnormalities and EF 30%. Still not wanting cardiac 

intervention though Full Code and would want return to the hospital if gets SOB 

again. 





- Patient Problems


(1) Sepsis


Current Visit: Yes   Status: Acute   Comment: fever, tachycardia, leukocytosis, 

tachpynea, acute hypoxic respiratory failure secondary to left middle lobe 

pneumonia. 


fever curve, HR, RR improving, still with significant oxygen requirement. 





   





(2) Pneumonia


Current Visit: Yes   Status: Acute   Code(s): J18.9 - PNEUMONIA, UNSPECIFIED 

ORGANISM   SNOMED Code(s): 749117087


   Comment: Continue ceftriaxone, azithromycin


strep pna and legionella urine Ag negative. 


f/u Bcx NGTD


f/u sputum cx(has not been able to provide)


flu negative. 


2 view CXR with continue left lung consolidation, described as more pleural 

based. He is former smoker and will need continued imaging as outpatient to 

rule out parenchymal pathology.    





(3) CAD (coronary artery disease)


Current Visit: Yes   Status: Acute   Code(s): I25.10 - ATHSCL HEART DISEASE OF 

NATIVE CORONARY ARTERY W/O ANG PCTRS   SNOMED Code(s): 21605665


   Comment: reported 4 stents (2002)


continue nitro SL prn. 


continue (new) metoprolol 12.5mg q12 for now given resolving sepsis. titrate up 

as able


former smoker (quit 18 years ago)


refusing nuclear stress or LHC. EKG with inferior Q wave in III and anterior 

lateral ST depressions and TWI II. ECHO with multiple regional wall motion 

abnormalities. Will attempt medical management.    





(4) HLD (hyperlipidemia)


Current Visit: Yes   Status: Acute   Code(s): E78.5 - HYPERLIPIDEMIA, 

UNSPECIFIED   SNOMED Code(s): 85296407


   Comment: Will increase to 80mg atorvastatin from started 10mg given 

suspected severe underlying CAD and confirmed DM. (home is pravastatin 40mg)


HDL 71, 


HDL 33   





(5) Kidney disease


Current Visit: Yes   Status: Acute   Comment: Unknown baseline, but likely 

degree of chronic kidney disease. 


CRT improved 1.93 ->1.76-> 1.67 with IVF. 


Plan to obtain records from Dr. Lind if still here on Monday. 


Will need urine microalbumin if none as outpatient given confirmed DM.    





(6) Diabetes mellitus


Current Visit: Yes   Status: Acute   Code(s): E11.9 - TYPE 2 DIABETES MELLITUS 

WITHOUT COMPLICATIONS   SNOMED Code(s): 44065808


   Comment: A1C checked: 7.1%


continue sliding scale


adding lantus 8U q24 given hyperglycemic in 200s.    


Status and Disposition: 





medicine inpatient. still with significant oxygen requirement. PT ordered (

though would likely refuse placement)

## 2018-12-02 NOTE — ECHO
Patient:      MIA SCHMID  

Med Rec#:     [id number]            :          [id number]          

Date:         2018            Age:          86y                 

Account#:     [id number]          Height:       168 cm / 66.1 in

Accession#:   [id number]           Weight:       81.1 kg / 178.7 lbs

Sex:          M                     BSA:          1.91

Room#:        [facility]                 

Admit Date#:  2018          

Type:         Inpatient

 

Referring:    Deborah Gallo

Reading:      Dada Ross DO

Sonographer:  aPtsy Smalls RDCS

CC:           Humble Lind MD

______________________________________________________________________

 

Transthoracic Echocardiogram

 

Indication:

Chest pain

BP:           111/58

HR:           114

Rhythm:       A-Fib

 

Findings     

History:

DM, HTN, HLD, MI, stents. 

 

Technical Comments:

The study quality is fair.  Completed at 1310. 

 

Left Ventricle:

The left ventricular chamber size is normal. Mild concentric left

ventricular hypertrophy is observed. There are multiple regional wall

motion abnormalities.  There is moderate to severely decreased left

ventricular systolic function. at 30% The assessment of diastolic

function is non-diagnostic. The  basal anterior, basal inferolateral,

mid anterior, mid anterolateral, mid inferior, and  apical anterior wall

segments are hypokinetic (score 2). The  mid inferolateral, apical

septal, apical lateral, and  apical inferior wall segments are akinetic

(score 3). The  basal inferior wall segment is dyskinetic (score 4).

Overall wallmotion score index is  2.21 

 

Left Atrium:

The left atrium is slightly dilated.  

 

Right Ventricle:

The right ventricular chamber size and systolic function are within

normal limits. 

 

Right Atrium:

The right atrial cavity size is normal. 

 

Aortic Valve:

The aortic valve structure is not well visualized. Moderate aortic

leaflet calcification is visualized. Systolic excursion of the aortic

valve cusps is reduced. There is a trace of aortic regurgitation. There

is mild to moderate aortic stenosis. The mean gradient of the aortic

valve is 10 mmHg.  The peak instantaneous gradient of the aortic valve

is 18 mmHg.  The aortic valve area, by VTI's, is calculated at 1.42 cm2.

 

 

Mitral Valve:

There is mitral annular calcification. The mitral valve leaflets are

mildly thickened. There is moderate mitral regurgitation.  There is no

evidence of mitral stenosis. 

 

Tricuspid Valve:

The tricuspid valve leaflets are normal.  There is mild tricuspid

regurgitation. The right ventricular systolic pressure is estimated at

63 mmHg.  There is evidence of moderate to severe pulmonary

hypertension. There is no tricuspid stenosis. 

 

Pulmonic Valve:

The pulmonic valve appears normal. There is a trace pulmonic

regurgitation.  There is no pulmonic stenosis.  

 

Pericardium:

There is no significant pericardial effusion. 

 

Aorta:

There is no dilatation of the ascending aorta. The aortic arch is not

well visualized.  The aortic root is normal in size. 

 

Pulmonary Artery:

The main pulmonary artery is not well visualized. 

 

Venous:

The inferior vena cava is dilated.  There is a greater than 50%

respiratory change in the inferior vena cava dimension. 

 

Conclusions

The left ventricular chamber size is normal.

Mild concentric left ventricular hypertrophy is observed.

There are multiple regional wall motion abnormalities. 

There is moderate to severely decreased left ventricular systolic

function at 30%

The left atrium is slightly dilated. 

There is mild to moderate aortic stenosis.

There is evidence of moderate to severe pulmonary hypertension.

 

None prior for comparison at time of interpretation  

 

Measurements     

Name                    Value         Normal Range            

RVIDd (AP) 2D           3.1 cm        (0.9 - 2.6)             

RVDdMajor (2D)          4.8 cm        (2.2 - 4.4)             

RAd ISD 4CH             4.9 cm        (3.4 - 4.9)             

RA (A4C)W               4 cm          (2.9 - 4.6)             

IVSd (2D)               1.2 cm        (0.6 - 1)               

LVPWd (2D)              1.1 cm        (0.6 - 1)               

LVIDd (2D)              4.6 cm        (3.6 - 5.4)             

LVIDs (2D)              4 cm          -                        

LV FS (2D)              14 %          (25 - 45)               

Aortic Annulus          2.4 cm        (1.4 - 2.6)             

Ao root diameter (2D)   3.2 cm        (2.1 - 3.5)             

Ascending Ao            3.4 cm        (2.1 - 3.4)             

LA dimension (AP) 2D    3.7 cm        (2.3 - 3.8)             

LAd ISD 4CH             5.3 cm        (2.9 - 5.3)             

LA ISD 4CH W            4.2 cm        (2.5 - 4.5)             

 

Name                    Value         Normal Range            

LA ESV BP (A/L) index   34 ml/m2      -                        

 

Name                    Value         Normal Range            

MV E-wave Vmax          1 m/sec       -                        

MV deceleration time    173 msec      -                        

MV A-wave Vmax          0.8 m/sec     -                        

MV E:A ratio            1.2 ratio     -                        

LV septal e' Vmax       0.05 m/sec    -                        

LV lateral e' Vmax      0.06 m/sec    -                        

LV E:e' septal ratio    20 ratio      -                        

LV E:e' lateral ratio   17.5 ratio    -                        

 

Name                    Value         Normal Range            

AV Vmax                 2.2 m/sec     -                        

AV VTI                  39 cm         -                        

AV peak gradient        18 mmHg       -                        

AV mean gradient        10 mmHg       -                        

LVOT diameter           2 cm          -                        

LVOT Vmax               1.02 m/sec    -                        

LVOT VTI                17.61 cm      -                        

LVOT peak gradient      4.18 mmHg     -                        

LVOT mean gradient      1.57 mmHg     -                        

DOI (VTI)               0.31 ratio    -                        

FERNANDA (continuity VTI)    1.42 cm2      -                        

 

Name                    Value         Normal Range            

MV Vmax                 1.5 m/sec     -                        

MV VTI                  37 cm         -                        

MV peak gradient        3 mmHg        -                        

MV mean gradient        3 mmHg        -                        

MV PHT                  74 msec       -                        

MR Vmax                 5.2 m/sec     -                        

MR VTI                  136 cm        -                        

MR flow (PISA)          42.5 ml/sec   -                        

MR ERO                  0.08 cm2      -                        

MR PISA radius          0.3 cm        -                        

MR alias Vmax           41.9 cm/sec   -                        

MVA (PHT)               3 cm2         -                        

 

Name                    Value         Normal Range            

TR Vmax                 3.7 m/sec     -                        

TR peak gradient        55 mmHg       -                        

RAP                     8 mmHg        -                        

RVSP                    63 mmHg       -                        

IVC diameter            2.5 cm        -                        

 

Name                    Value         Normal Range            

PV Vmax                 1 m/sec       -                        

PV peak gradient        4 mmHg        -                        

 

Wallmotion

 

BAS          Not Seen

BA           Hypokinetic

BAL          Normal

SUDHAKAR          Hypokinetic

BI           Dyskinetic

BIS          Normal

MAS          Not Seen

MA           Hypokinetic

MAL          Hypokinetic

MIL          Akinetic

MI           Hypokinetic

MIS          Normal

AS           Akinetic

AA           Hypokinetic

AL           Akinetic

AI           Akinetic

APEX         Akinetic

 

Electronically signed by: Dada Ross DO on 2018 15:21:50

## 2018-12-03 LAB
BASOPHILS # BLD AUTO: 0 10^3/UL (ref 0–0.2)
EOSINOPHIL # BLD AUTO: 0.1 10^3/UL (ref 0–0.6)
HCT VFR BLD AUTO: 33 % (ref 42–52)
HGB BLD-MCNC: 11.2 G/DL (ref 14–18)
LYMPHOCYTES # BLD AUTO: 0.8 10^3/UL (ref 1–4.8)
MCH RBC QN AUTO: 31 PG (ref 27–31)
MCHC RBC AUTO-ENTMCNC: 34 G/DL (ref 31–36)
MCV RBC AUTO: 92 FL (ref 80–94)
MONOCYTES # BLD AUTO: 0.8 10^3/UL (ref 0–0.8)
NEUTROPHILS # BLD AUTO: 5.7 10^3/UL (ref 1.5–7.7)
NRBC # BLD AUTO: 0 10^3/UL
NRBC BLD QL AUTO: 0.1
PLATELET # BLD AUTO: 311 10^3/UL (ref 150–450)
RBC # BLD AUTO: 3.63 10^6/UL (ref 4–5.4)
WBC # BLD AUTO: 7.4 10^3/UL (ref 3.5–10.8)

## 2018-12-03 RX ADMIN — HEPARIN SODIUM SCH UNITS: 5000 INJECTION INTRAVENOUS; SUBCUTANEOUS at 20:57

## 2018-12-03 RX ADMIN — METOPROLOL TARTRATE SCH MG: 25 TABLET, FILM COATED ORAL at 08:44

## 2018-12-03 RX ADMIN — HEPARIN SODIUM SCH UNITS: 5000 INJECTION INTRAVENOUS; SUBCUTANEOUS at 13:59

## 2018-12-03 RX ADMIN — FUROSEMIDE SCH MG: 10 INJECTION, SOLUTION INTRAMUSCULAR; INTRAVENOUS at 08:44

## 2018-12-03 RX ADMIN — INSULIN LISPRO SCH UNITS: 100 INJECTION, SOLUTION INTRAVENOUS; SUBCUTANEOUS at 17:26

## 2018-12-03 RX ADMIN — ATORVASTATIN CALCIUM SCH MG: 80 TABLET, FILM COATED ORAL at 20:54

## 2018-12-03 RX ADMIN — METOPROLOL TARTRATE SCH MG: 25 TABLET, FILM COATED ORAL at 20:54

## 2018-12-03 RX ADMIN — TAMSULOSIN HYDROCHLORIDE SCH MG: 0.4 CAPSULE ORAL at 08:43

## 2018-12-03 RX ADMIN — ASPIRIN SCH MG: 81 TABLET, COATED ORAL at 08:44

## 2018-12-03 RX ADMIN — FOLIC ACID SCH MG: 1 TABLET ORAL at 08:44

## 2018-12-03 RX ADMIN — INSULIN LISPRO SCH UNITS: 100 INJECTION, SOLUTION INTRAVENOUS; SUBCUTANEOUS at 08:44

## 2018-12-03 RX ADMIN — INSULIN GLARGINE SCH UNITS: 100 INJECTION, SOLUTION SUBCUTANEOUS at 17:26

## 2018-12-03 RX ADMIN — CEFTRIAXONE SODIUM SCH MLS/HR: 1 INJECTION, POWDER, FOR SOLUTION INTRAVENOUS at 13:59

## 2018-12-03 RX ADMIN — FUROSEMIDE SCH MG: 10 INJECTION, SOLUTION INTRAMUSCULAR; INTRAVENOUS at 15:42

## 2018-12-03 RX ADMIN — CYANOCOBALAMIN TAB 500 MCG SCH MCG: 500 TAB at 08:44

## 2018-12-03 RX ADMIN — INSULIN LISPRO SCH UNITS: 100 INJECTION, SOLUTION INTRAVENOUS; SUBCUTANEOUS at 12:36

## 2018-12-03 RX ADMIN — HEPARIN SODIUM SCH UNITS: 5000 INJECTION INTRAVENOUS; SUBCUTANEOUS at 05:22

## 2018-12-03 NOTE — PN
Subjective


Date of Service: 12/03/18


Interval History: 





Pt walked for the first time since his admission today. Feels "well" but still 

has significant 02 needs, occasional cough





Objective


Active Medications: 








Acetaminophen (Tylenol Tab*)  650 mg PO Q4H PRN


   PRN Reason: FEVER/PAIN


Al Hydrox/Mg Hydrox/Simethicone (Maalox Plus*)  30 ml PO Q6H PRN


   PRN Reason: INDIGESTION


Albuterol (Ventolin 2.5 Mg/3 Ml Neb.Sol*)  2.5 mg INH [id number] AWAKE PRN


   PRN Reason: sob/wheezing


Aspirin (Aspirin Ec Tab*)  81 mg PO DAILY Formerly [facility]


   Last Admin: 12/03/18 08:44 Dose:  81 mg


Atorvastatin Calcium (Lipitor*)  80 mg PO BEDTIME Formerly [facility]; Protocol


   Last Admin: 12/02/18 21:12 Dose:  80 mg


Cyanocobalamin (Vitamin B12 Tab*)  1,000 mcg PO DAILY Former[facility]


   Last Admin: 12/03/18 08:44 Dose:  1,000 mcg


Dextrose (D50w Syringe 50 Ml*)  12.5 gm IV PUSH .FOR FS < 60 - SS PRN


   PRN Reason: FS < 60


Folic Acid (Folvite Tab*)  1 mg PO DAILY [facility]


   Last Admin: 12/03/18 08:44 Dose:  1 mg


Furosemide (Lasix Iv*)  40 mg IV 0800,1500 BRIANNE


   Last Admin: 12/03/18 15:42 Dose:  40 mg


Guaifenesin (Robitussin*)  20 ml PO QID PRN


   PRN Reason: CONGESTION


Heparin Sodium (Porcine) (Heparin Vial(*))  5,000 units SUBCUT Q8HR [facility]


   Last Admin: 12/03/18 13:59 Dose:  5,000 units


Ceftriaxone Sodium 1 gm/ (Sodium Chloride)  50 mls @ 200 mls/hr IVPB Q24H [facility]


   Last Admin: 12/03/18 13:59 Dose:  200 mls/hr


Insulin Glargine (Lantus(*))  8 units SUBCUT Q24H BRIANNE


   Last Admin: 12/03/18 17:26 Dose:  8 units


Insulin Human Lispro (Humalog*)  0 units SUBCUT AC BRIANNE; Protocol


   Last Admin: 12/03/18 17:26 Dose:  5 units


Metoprolol Tartrate (Lopressor Tab*)  12.5 mg PO Q12HR [facility]


   Last Admin: 12/03/18 08:44 Dose:  12.5 mg


Nitroglycerin (Nitroglycerin Tab 0.4 Mg*)  0.4 mg SL Q5M PRN


   PRN Reason: PAIN - CHEST


Tamsulosin HCl (Flomax Cap*)  0.8 mg PO DAILY [facility]


   Last Admin: 12/03/18 08:43 Dose:  0.8 mg








 Vital Signs - 8 hr











  12/03/18 12/03/18 12/03/18





  11:43 12:33 15:36


 


Temperature 97.9 F 98.7 F 99.6 F


 


Pulse Rate 76 84 64


 


Respiratory 18 18 20





Rate   


 


Blood Pressure 94/80 98/52 122/56





(mmHg)   


 


O2 Sat by Pulse 100 98 98





Oximetry   











Oxygen Devices in Use Now: Nasal Cannula


Appearance: 86 yo M in nAD, aAOx3


Eyes: No Scleral Icterus, PERRLA


Ears/Nose/Mouth/Throat: NL Teeth, Lips, Gums, Mucous Membranes Moist


Neck: NL Appearance and Movements; NL JVP, Trachea Midline


Respiratory: Symmetrical Chest Expansion and Respiratory Effort, - - LLL rhonchi


Cardiovascular: NL Sounds; No Murmurs; No JVD, RRR


Abdominal: NL Sounds; No Tenderness; No Distention


Lymphatic: No Cervical Adenopathy


Extremities: No Edema


Skin: No Rash or Ulcers, No Nodules or Sclerosis


Neurological: Alert and Oriented x 3, NL Muscle Strength and Tone


Result Diagrams: 


 12/03/18 06:02





 12/03/18 06:02


Additional Lab and Data: 


 Laboratory Results - last 24 hr











  12/02/18 12/02/18 12/02/18





  05:26 05:26 05:26


 


WBC    10.0


 


RBC    3.61 L


 


Hgb    11.1 L


 


Hct    33 L


 


MCV    92


 


MCH    31


 


MCHC    34


 


RDW    14


 


Plt Count    338


 


MPV    6.7 L


 


Neut % (Auto)    81.2


 


Lymph % (Auto)    7.2


 


Mono % (Auto)    11.3


 


Eos % (Auto)    0.1


 


Baso % (Auto)    0.2


 


Absolute Neuts (auto)    8.1 H


 


Absolute Lymphs (auto)    0.7 L


 


Absolute Monos (auto)    1.1 H


 


Absolute Eos (auto)    0


 


Absolute Basos (auto)    0


 


Absolute Nucleated RBC    0


 


Nucleated RBC %    0.1


 


Sodium   137 


 


Potassium   4.8 


 


Chloride   103 


 


Carbon Dioxide   25 


 


Anion Gap   9 


 


BUN   34 H 


 


Creatinine   1.67 H 


 


Est GFR ( Amer)   47.4 


 


Est GFR (Non-Af Amer)   39.2 


 


BUN/Creatinine Ratio   20.4 H 


 


Glucose   220 H 


 


POC Glucose (mg/dL)   


 


Hemoglobin A1c  7.1 H  


 


Calcium   8.8 














  12/02/18 12/02/18 12/02/18





  07:12 11:09 16:14


 


WBC   


 


RBC   


 


Hgb   


 


Hct   


 


MCV   


 


MCH   


 


MCHC   


 


RDW   


 


Plt Count   


 


MPV   


 


Neut % (Auto)   


 


Lymph % (Auto)   


 


Mono % (Auto)   


 


Eos % (Auto)   


 


Baso % (Auto)   


 


Absolute Neuts (auto)   


 


Absolute Lymphs (auto)   


 


Absolute Monos (auto)   


 


Absolute Eos (auto)   


 


Absolute Basos (auto)   


 


Absolute Nucleated RBC   


 


Nucleated RBC %   


 


Sodium   


 


Potassium   


 


Chloride   


 


Carbon Dioxide   


 


Anion Gap   


 


BUN   


 


Creatinine   


 


Est GFR ( Amer)   


 


Est GFR (Non-Af Amer)   


 


BUN/Creatinine Ratio   


 


Glucose   


 


POC Glucose (mg/dL)  222 H  279 H  282 H


 


Hemoglobin A1c   


 


Calcium   














Microbiology and Other Data: 











  Microbiology





11/30/18 12:45   Blood Venous   Aerobic Blood Culture - Preliminary


                            No Growth Day 2


11/30/18 12:45   Blood Venous   Anaerobic Blood Culture - Preliminary


                            No Growth Day 2


12/01/18 01:55   Urine   Urine Culture - Final


12/01/18 01:55   Urine   Legionella Urinary Antigen - Final


                            Negative Legionella Antigen


12/01/18 01:55   Urine   Streptococcus pneumoniae Ag Screen - Final


                            Negative S. pneumo Antigen


11/30/18 13:03   Nasal   Influenza Types A,B Antigen - Final


                            Specimen received for Influenza A/B Molecular 

testing

















Assess/Plan/Problems-Billing


Assessment: 





87 yo male PMH CAD with 4 stents with 2002 [facility] documenting subtotal LAD 

occlusion, NIDDM, HTHN, HLD with chronic SOUZA and exertional chest pain but 

refusing additional cardiac workup presenting with left chest pain and 

suprapubic pain. left midlung consolidation, fever, leukocytosis, tachycardia, 

tachypnea suspicious for sepsis secondary to pneumonia. Acute hypoxic 

respiratory failure. CFTX, azithromycin. ECHO with evidence of multiple 

regional wall motion abnormalities and EF 30%. Not wanting cardiac intervention 

though Full Code and would want return to the hospital if gets SOB again. 





- Patient Problems


(1) CAD (coronary artery disease)


Comment: reported 4 stents (2002)


continue nitro SL prn. 


continue (new) metoprolol 12.5mg q12 for now given resolving sepsis. titrate up 

as able


former smoker (quit 18 years ago)


refusing nuclear stress or LHC. EKG with inferior Q wave in III and anterior 

lateral ST depressions and TWI II. ECHO with multiple regional wall motion 

abnormalities. Will attempt medical management. Aware of EF 30%   





(2) Diabetes mellitus


Comment: A1C checked: 7.1%


continue sliding scale


added  lantus 8U q24 given hyperglycemic in 200s on 12/2/18. Restarting 

glipizide for 12/4/18.    





(3) HLD (hyperlipidemia)


Comment:  atorvastatin increased to 80 mg from started 10mg given suspected 

severe underlying CAD and confirmed DM. (home is pravastatin 40mg)


HDL 71, 


HDL 33   





(4) Kidney disease


Comment: Unknown baseline, but likely degree of chronic kidney disease. 


CRT improved 1.93 ->1.76-> 1.67 with IVF. 


   





(5) Pneumonia


Comment: Continue ceftriaxone, azithromycin


strep pna and legionella urine Ag negative. 


f/u Bcx NGTD


f/u sputum cx(has not been able to provide)


flu negative. 


2 view CXR with continue left lung consolidation, described as more pleural 

based. He is former smoker and will need continued imaging as outpatient to 

rule out parenchymal pathology.    





(6) Sepsis


Comment: fever, tachycardia, leukocytosis, tachpynea, acute hypoxic respiratory 

failure secondary to left middle lobe pneumonia. 


fever curve, HR, RR improving, still with significant oxygen requirement. 





   





(7) Acute systolic CHF (congestive heart failure)


Comment: cont Lasix 40 mg IV BID


EF noted at 30% and mod AS.   





(8) DVT prophylaxis


Comment: HSQ   


Status and Disposition: 





medicine inpatient. still with significant oxygen requirement. PT cont (though 

would likely refuse placement)

## 2018-12-04 LAB
BASOPHILS # BLD AUTO: 0.1 10^3/UL (ref 0–0.2)
EOSINOPHIL # BLD AUTO: 0.2 10^3/UL (ref 0–0.6)
HCT VFR BLD AUTO: 33 % (ref 42–52)
HGB BLD-MCNC: 10.8 G/DL (ref 14–18)
LYMPHOCYTES # BLD AUTO: 0.8 10^3/UL (ref 1–4.8)
MCH RBC QN AUTO: 30 PG (ref 27–31)
MCHC RBC AUTO-ENTMCNC: 33 G/DL (ref 31–36)
MCV RBC AUTO: 91 FL (ref 80–94)
MONOCYTES # BLD AUTO: 0.8 10^3/UL (ref 0–0.8)
NEUTROPHILS # BLD AUTO: 5.3 10^3/UL (ref 1.5–7.7)
NRBC # BLD AUTO: 0 10^3/UL
NRBC BLD QL AUTO: 0
PLATELET # BLD AUTO: 364 10^3/UL (ref 150–450)
RBC # BLD AUTO: 3.58 10^6/UL (ref 4–5.4)
WBC # BLD AUTO: 7.1 10^3/UL (ref 3.5–10.8)

## 2018-12-04 RX ADMIN — INSULIN LISPRO SCH UNITS: 100 INJECTION, SOLUTION INTRAVENOUS; SUBCUTANEOUS at 12:35

## 2018-12-04 RX ADMIN — GLIPIZIDE SCH MG: 5 TABLET ORAL at 08:26

## 2018-12-04 RX ADMIN — INSULIN LISPRO SCH UNITS: 100 INJECTION, SOLUTION INTRAVENOUS; SUBCUTANEOUS at 17:31

## 2018-12-04 RX ADMIN — FUROSEMIDE SCH MG: 10 INJECTION, SOLUTION INTRAMUSCULAR; INTRAVENOUS at 08:26

## 2018-12-04 RX ADMIN — CYANOCOBALAMIN TAB 500 MCG SCH MCG: 500 TAB at 08:26

## 2018-12-04 RX ADMIN — ATORVASTATIN CALCIUM SCH MG: 80 TABLET, FILM COATED ORAL at 20:45

## 2018-12-04 RX ADMIN — INSULIN GLARGINE SCH UNITS: 100 INJECTION, SOLUTION SUBCUTANEOUS at 17:31

## 2018-12-04 RX ADMIN — HEPARIN SODIUM SCH UNITS: 5000 INJECTION INTRAVENOUS; SUBCUTANEOUS at 14:27

## 2018-12-04 RX ADMIN — TAMSULOSIN HYDROCHLORIDE SCH MG: 0.4 CAPSULE ORAL at 08:26

## 2018-12-04 RX ADMIN — METOPROLOL TARTRATE SCH MG: 25 TABLET, FILM COATED ORAL at 08:26

## 2018-12-04 RX ADMIN — CEFTRIAXONE SODIUM SCH MLS/HR: 1 INJECTION, POWDER, FOR SOLUTION INTRAVENOUS at 14:27

## 2018-12-04 RX ADMIN — FOLIC ACID SCH MG: 1 TABLET ORAL at 08:26

## 2018-12-04 RX ADMIN — INSULIN LISPRO SCH UNITS: 100 INJECTION, SOLUTION INTRAVENOUS; SUBCUTANEOUS at 08:26

## 2018-12-04 RX ADMIN — HEPARIN SODIUM SCH UNITS: 5000 INJECTION INTRAVENOUS; SUBCUTANEOUS at 05:01

## 2018-12-04 RX ADMIN — FUROSEMIDE SCH MG: 10 INJECTION, SOLUTION INTRAMUSCULAR; INTRAVENOUS at 14:27

## 2018-12-04 RX ADMIN — ASPIRIN SCH MG: 81 TABLET, COATED ORAL at 08:26

## 2018-12-04 RX ADMIN — HEPARIN SODIUM SCH UNITS: 5000 INJECTION INTRAVENOUS; SUBCUTANEOUS at 20:47

## 2018-12-04 RX ADMIN — METOPROLOL TARTRATE SCH MG: 25 TABLET, FILM COATED ORAL at 20:46

## 2018-12-04 RX ADMIN — MAGNESIUM OXIDE TAB 400 MG (241.3 MG ELEMENTAL MG) SCH MG: 400 (241.3 MG) TAB at 14:27

## 2018-12-04 NOTE — PN
Subjective


Date of Service: 12/04/18


Interval History: 





Pt had a few beats of SVT after walking with PT today. Asymptomatic. Wants to 

go home tomorrow "no matter what". Is aware that he may go on home 02.


Pt requests regular diet due to problems getting enough nutrition on cardiac, 

diabetic diet





Objective


Active Medications: 








Acetaminophen (Tylenol Tab*)  650 mg PO Q4H PRN


   PRN Reason: FEVER/PAIN


Al Hydrox/Mg Hydrox/Simethicone (Maalox Plus*)  30 ml PO Q6H PRN


   PRN Reason: INDIGESTION


Albuterol (Ventolin 2.5 Mg/3 Ml Neb.Sol*)  2.5 mg INH [id number] AWAKE PRN


   PRN Reason: sob/wheezing


Aspirin (Aspirin Ec Tab*)  81 mg PO DAILY [facility]


   Last Admin: 12/04/18 08:26 Dose:  81 mg


Atorvastatin Calcium (Lipitor*)  80 mg PO BEDTIME [facility]; Protocol


   Last Admin: 12/03/18 20:54 Dose:  80 mg


Cyanocobalamin (Vitamin B12 Tab*)  1,000 mcg PO DAILY [facility]


   Last Admin: 12/04/18 08:26 Dose:  1,000 mcg


Dextrose (D50w Syringe 50 Ml*)  12.5 gm IV PUSH .FOR FS < 60 - SS PRN


   PRN Reason: FS < 60


Folic Acid (Folvite Tab*)  1 mg PO DAILY [facility]


   Last Admin: 12/04/18 08:26 Dose:  1 mg


Furosemide (Lasix Iv*)  40 mg IV 0800,1500 [facility]


   Last Admin: 12/04/18 08:26 Dose:  40 mg


Glipizide (Glucotrol Tab*)  5 mg PO QAM [facility]


   Last Admin: 12/04/18 08:26 Dose:  5 mg


Guaifenesin (Robitussin*)  20 ml PO QID PRN


   PRN Reason: CONGESTION


Heparin Sodium (Porcine) (Heparin Vial(*))  5,000 units SUBCUT Q8HR [facility]


   Last Admin: 12/04/18 05:01 Dose:  5,000 units


Ceftriaxone Sodium 1 gm/ (Sodium Chloride)  50 mls @ 200 mls/hr IVPB Q24H [facility]


   Last Admin: 12/03/18 13:59 Dose:  200 mls/hr


Potassium Chloride (Potassium Chloride 20 Meq/100 Ml Ivpremix*)  20 meq in 100 

mls @ 50 mls/hr IV ONCE ONE


   Stop: 12/04/18 15:33


Insulin Glargine (Lantus(*))  8 units SUBCUT Q24H [facility]


   Last Admin: 12/03/18 17:26 Dose:  8 units


Insulin Human Lispro (Humalog*)  0 units SUBCUT AC [facility]; Protocol


   Last Admin: 12/04/18 12:35 Dose:  3 units


Magnesium Oxide (Magox 400 Tab*)  800 mg PO DAILY [facility]


Metoprolol Tartrate (Lopressor Tab*)  12.5 mg PO Q12HR [facility]


   Last Admin: 12/04/18 08:26 Dose:  12.5 mg


Nitroglycerin (Nitroglycerin Tab 0.4 Mg*)  0.4 mg SL Q5M PRN


   PRN Reason: PAIN - CHEST


Tamsulosin HCl (Flomax Cap*)  0.8 mg PO DAILY [facility]


   Last Admin: 12/04/18 08:26 Dose:  0.8 mg








 Vital Signs - 8 hr











  12/04/18 12/04/18 12/04/18





  07:29 08:00 11:51


 


Temperature 98.5 F  98.0 F


 


Pulse Rate 74  74


 


Respiratory 18 18 20





Rate   


 


Blood Pressure 100/48  126/65





(mmHg)   


 


O2 Sat by Pulse 98  96





Oximetry   











Oxygen Devices in Use Now: Nasal Cannula


Appearance: 86 yo M in nAD, aAOx3


Eyes: No Scleral Icterus, PERRLA


Ears/Nose/Mouth/Throat: NL Teeth, Lips, Gums, Mucous Membranes Moist


Neck: NL Appearance and Movements; NL JVP, Trachea Midline


Respiratory: Symmetrical Chest Expansion and Respiratory Effort, - - left lung 

crackles at base


Cardiovascular: RRR, - - 2/6 TIKI at RUSB


Abdominal: NL Sounds; No Tenderness; No Distention


Lymphatic: No Cervical Adenopathy


Extremities: No Edema, No Clubbing, Cyanosis


Skin: No Rash or Ulcers, No Nodules or Sclerosis


Neurological: Alert and Oriented x 3, NL Muscle Strength and Tone


Result Diagrams: 


 12/04/18 05:31





 12/04/18 05:31


Additional Lab and Data: 


 Laboratory Results - last 24 hr











  12/02/18 12/02/18 12/02/18





  05:26 05:26 05:26


 


WBC    10.0


 


RBC    3.61 L


 


Hgb    11.1 L


 


Hct    33 L


 


MCV    92


 


MCH    31


 


MCHC    34


 


RDW    14


 


Plt Count    338


 


MPV    6.7 L


 


Neut % (Auto)    81.2


 


Lymph % (Auto)    7.2


 


Mono % (Auto)    11.3


 


Eos % (Auto)    0.1


 


Baso % (Auto)    0.2


 


Absolute Neuts (auto)    8.1 H


 


Absolute Lymphs (auto)    0.7 L


 


Absolute Monos (auto)    1.1 H


 


Absolute Eos (auto)    0


 


Absolute Basos (auto)    0


 


Absolute Nucleated RBC    0


 


Nucleated RBC %    0.1


 


Sodium   137 


 


Potassium   4.8 


 


Chloride   103 


 


Carbon Dioxide   25 


 


Anion Gap   9 


 


BUN   34 H 


 


Creatinine   1.67 H 


 


Est GFR ( Amer)   47.4 


 


Est GFR (Non-Af Amer)   39.2 


 


BUN/Creatinine Ratio   20.4 H 


 


Glucose   220 H 


 


POC Glucose (mg/dL)   


 


Hemoglobin A1c  7.1 H  


 


Calcium   8.8 














  12/02/18 12/02/18 12/02/18





  07:12 11:09 16:14


 


WBC   


 


RBC   


 


Hgb   


 


Hct   


 


MCV   


 


MCH   


 


MCHC   


 


RDW   


 


Plt Count   


 


MPV   


 


Neut % (Auto)   


 


Lymph % (Auto)   


 


Mono % (Auto)   


 


Eos % (Auto)   


 


Baso % (Auto)   


 


Absolute Neuts (auto)   


 


Absolute Lymphs (auto)   


 


Absolute Monos (auto)   


 


Absolute Eos (auto)   


 


Absolute Basos (auto)   


 


Absolute Nucleated RBC   


 


Nucleated RBC %   


 


Sodium   


 


Potassium   


 


Chloride   


 


Carbon Dioxide   


 


Anion Gap   


 


BUN   


 


Creatinine   


 


Est GFR ( Amer)   


 


Est GFR (Non-Af Amer)   


 


BUN/Creatinine Ratio   


 


Glucose   


 


POC Glucose (mg/dL)  222 H  279 H  282 H


 


Hemoglobin A1c   


 


Calcium   














Microbiology and Other Data: 











  Microbiology





11/30/18 12:45   Blood Venous   Aerobic Blood Culture - Preliminary


                            No Growth Day 2


11/30/18 12:45   Blood Venous   Anaerobic Blood Culture - Preliminary


                            No Growth Day 2


12/01/18 01:55   Urine   Urine Culture - Final


12/01/18 01:55   Urine   Legionella Urinary Antigen - Final


                            Negative Legionella Antigen


12/01/18 01:55   Urine   Streptococcus pneumoniae Ag Screen - Final


                            Negative S. pneumo Antigen


11/30/18 13:03   Nasal   Influenza Types A,B Antigen - Final


                            Specimen received for Influenza A/B Molecular 

testing

















Assess/Plan/Problems-Billing


Assessment: 





87 yo male PMH CAD with 4 stents with 2002 C documenting subtotal LAD 

occlusion, NIDDM, HTHN, HLD with chronic SOUZA and exertional chest pain but 

refusing additional cardiac workup presenting with left chest pain and 

suprapubic pain. left midlung consolidation, fever, leukocytosis, tachycardia, 

tachypnea suspicious for sepsis secondary to pneumonia. Acute hypoxic 

respiratory failure. CFTX, azithromycin. ECHO with evidence of multiple 

regional wall motion abnormalities and EF 30%. Not wanting cardiac intervention 

though Full Code and would want return to the hospital if gets SOB again. 





- Patient Problems


(1) CAD (coronary artery disease)


Comment: reported 4 stents (2002)


continue nitro SL prn. 


continue (new) metoprolol 12.5mg q12 for now given resolving sepsis,  titrate 

up today to 25 mg BID.


former smoker (quit 18 years ago)


refusing nuclear stress or LHC. EKG with inferior Q wave in III and anterior 

lateral ST depressions and TWI II. ECHO with multiple regional wall motion 

abnormalities. Will cont  medical management. Aware of EF 30%   





(2) Diabetes mellitus


Comment: A1C checked: 7.1%


continue sliding scale


added  lantus 8U q24 given hyperglycemic in 200s on 12/2/18. Restarting 

glipizide for 12/4/18.    





(3) HLD (hyperlipidemia)


Comment:  atorvastatin increased to 80 mg from started 10mg given suspected 

severe underlying CAD and confirmed DM. (home is pravastatin 40mg)


HDL 71, 


HDL 33   





(4) Kidney disease


Comment: Unknown baseline, but likely degree of chronic kidney disease. 


CRT improved 1.93 ->1.76-> 1.67 with IVF. 


   





(5) Pneumonia


Comment: Continue ceftriaxone, azithromycin


strep pna and legionella urine Ag negative. 


f/u Bcx NGTD


f/u sputum cx(has not been able to provide)


flu negative. 


2 view CXR with continue left lung consolidation, described as more pleural 

based. He is former smoker and will need continued imaging as outpatient to 

rule out parenchymal pathology.    





(6) Sepsis


Comment: fever, tachycardia, leukocytosis, tachpynea, acute hypoxic respiratory 

failure secondary to left middle lobe pneumonia. 


fever curve, HR, RR improving, still with significant oxygen requirement. 





   





(7) Acute systolic CHF (congestive heart failure)


Comment: cont Lasix 40 mg IV BID-last dose tonight


EF noted at 30% and mod AS.   





(8) DVT prophylaxis


Comment: HSQ   


Status and Disposition: 





medicine inpatient. still with significant oxygen requirement. PT cont (though 

would likely refuse placement)

## 2018-12-05 VITALS — SYSTOLIC BLOOD PRESSURE: 124 MMHG | DIASTOLIC BLOOD PRESSURE: 36 MMHG

## 2018-12-05 LAB
BASOPHILS # BLD AUTO: 0.1 10^3/UL (ref 0–0.2)
EOSINOPHIL # BLD AUTO: 0.4 10^3/UL (ref 0–0.6)
HCT VFR BLD AUTO: 34 % (ref 42–52)
HGB BLD-MCNC: 11.2 G/DL (ref 14–18)
LYMPHOCYTES # BLD AUTO: 0.8 10^3/UL (ref 1–4.8)
MCH RBC QN AUTO: 30 PG (ref 27–31)
MCHC RBC AUTO-ENTMCNC: 34 G/DL (ref 31–36)
MCV RBC AUTO: 90 FL (ref 80–94)
MONOCYTES # BLD AUTO: 0.7 10^3/UL (ref 0–0.8)
NEUTROPHILS # BLD AUTO: 6 10^3/UL (ref 1.5–7.7)
NRBC # BLD AUTO: 0 10^3/UL
NRBC BLD QL AUTO: 0.2
PLATELET # BLD AUTO: 418 10^3/UL (ref 150–450)
RBC # BLD AUTO: 3.71 10^6/UL (ref 4–5.4)
WBC # BLD AUTO: 7.9 10^3/UL (ref 3.5–10.8)

## 2018-12-05 RX ADMIN — GLIPIZIDE SCH MG: 5 TABLET ORAL at 09:50

## 2018-12-05 RX ADMIN — METOPROLOL TARTRATE SCH MG: 25 TABLET, FILM COATED ORAL at 09:50

## 2018-12-05 RX ADMIN — CYANOCOBALAMIN TAB 500 MCG SCH MCG: 500 TAB at 09:50

## 2018-12-05 RX ADMIN — FOLIC ACID SCH MG: 1 TABLET ORAL at 09:50

## 2018-12-05 RX ADMIN — HEPARIN SODIUM SCH UNITS: 5000 INJECTION INTRAVENOUS; SUBCUTANEOUS at 05:16

## 2018-12-05 RX ADMIN — INSULIN LISPRO SCH UNITS: 100 INJECTION, SOLUTION INTRAVENOUS; SUBCUTANEOUS at 09:50

## 2018-12-05 RX ADMIN — TAMSULOSIN HYDROCHLORIDE SCH MG: 0.4 CAPSULE ORAL at 09:50

## 2018-12-05 RX ADMIN — MAGNESIUM OXIDE TAB 400 MG (241.3 MG ELEMENTAL MG) SCH MG: 400 (241.3 MG) TAB at 09:50

## 2018-12-05 RX ADMIN — ASPIRIN SCH MG: 81 TABLET, COATED ORAL at 09:50

## 2018-12-06 NOTE — DS
CC:  Dr. Lind; [facility] - Dr. Molina; Dr. Myers *

 

DISCHARGE SUMMARY:

 

DATE OF ADMISSION:  11/30/18

 

DATE OF DISCHARGE:  12/05/18

 

PRIMARY CARE PROVIDER:  Dr. Humble Lind.

 

DISCHARGE DIAGNOSES:

1.  Sepsis due to pneumonia, community acquired.

2.  Acute systolic congestive heart failure.

3.  Acute renal failure and likely chronic kidney disease.

4.  Elevated troponin likely due to demand ischemia.

5.  Acute hypoxemic respiratory failure that resolved by the time of discharge. 
The patient is being discharged with no oxygen supplementation.

 

PAST MEDICAL HISTORY:

1.  Coronary artery disease, status post stent placement in the past.

2.  Diabetes mellitus.

3.  Hypertension.

4.  Hyperlipidemia.

5.  History of benign prostatic hyperplasia.

 

MEDICATIONS AT DISCHARGE:  Include:

1.  Augmentin 500 mg p.o. b.i.d. for total of 2 days, then stop.

2.  Aspirin 325 mg daily.

3.  Vitamin B12 1000 mcg daily.

4.  Folvite 1 mg daily.

5.  Glucotrol 5 mg daily.

6.  Guaifenesin 400 mg up to 4 times a day p.r.n.

7.  Lisinopril 5 mg a day.

8.  Nitroglycerin tablet 0.4 mg on a p.r.n. basis.

9.  Pravachol 40 mg at bedtime.

10.  Flomax 0.8 mg daily.

11.  Furosemide 40 mg daily.

12.  Metoprolol tartrate 25 mg every 12 hours.

 

DISCHARGE FOLLOWUP:  The patient is being set up with visiting nurse 
association at discharge.

 

The patient is recommended to follow up with Dr. Lind in 4 to 7 days and 
repeat blood work to be done on Friday, 

12/07/18.

 

The patient also is set up for [facility] on Monday, 12/10/18, if 
unable to follow up with [facility] within the next week.

 

LABORATORY DATA AND STUDIES PERFORMED DURING HOSPITAL STAY:  Included:  On 12/05
/18, white blood cell count of 7.9, hemoglobin 11.2, hematocrit of 34, and 
platelets of 418.  Sodium was 135, potassium 4.0, chloride 95, carbon dioxide 29
, BUN 42, creatinine 1.57.

 

The patient's creatinine at admission was 1.9.

 

The patient's hemoglobin A1c obtained on 12/02/18 was 7.1.

 

The patient's troponin peaked at 0.14 on 11/30/18.

 

Cholesterol profile showed triglycerides of 138, cholesterol total of 132, LDL 
of 71, and HDL of 33.

 

Transthoracic echocardiogram obtained on 11/30/18 showed "mild concentric LVH. 
There are multiple regional wall motion abnormalities with moderate-to-severely 
decreased left ventricular systolic function at 30%.  The left atrium is 
slightly dilated.  There is mild-to-moderate aortic stenosis with evidence of 
moderate-to- severe pulmonary hypertension."

 

Most recent chest x-ray obtained on 12/02/18, impression:  "Left lung 
infiltrate _____ progression with possible superimposed congestive heart 
failure."

 

HOSPITALIZATION COURSE:  Valente Langston is an 87-year-old male, who 
presented to the hospital on 11/30/18 complaining of chest pain and fever.  He 
was noted to have sepsis due to community-acquired pneumonia.  He also had 
elevated troponin and creatinine of 1.9.  He was treated with broad-spectrum 
antibiotics including ceftriaxone and azithromycin, and his microbiology 
studies were negative for legionella antigen and Streptococcus pneumo antigen.  
Blood cultures were negative in growth and serology was negative for flu test.  
The patient gradually improved but he was also noted to have an EF of 30% with 
an elevated troponin likely due to demand ischemia.  The patient also noted to 
have multiple regional wall motion abnormalities on his echocardiogram and 
further evaluation with either stress test or evaluation by Cardiology was 
recommended, which the patient adamantly refused. He did not wish any further 
invasive procedures or treatments with regards to his heart.

 

The patient was furthermore treated for acute systolic CHF with IV diuretics 
throughout his hospital stay.  His weight by the time of discharge was down by 
approximately 11 pounds, from 178 pounds at admission to 167 pounds at 
discharge. The patient was recommended at discharge to follow up with his 
weights on a daily basis and if his weight increases to above 170 pounds, to 
call his doctor for further management with diuretics.

 

He also had significant hypoxemia due to CHF and pneumonia during his hospital 
stay but that resolved by the time of discharge.  On the day of discharge, he 
ambulated on room air with oxygen saturations above 90%.  At discharge, I 
informed the patient that it is important for him to check his fluid status on 
a daily basis with his weight.  He is being discharged on 40 mg of Lasix on a 
daily basis but it is possible that he will either have too much or too little 
of the diuretics and that needs to be followed closely.  He is to have a basic 
metabolic panel drawn on 12/07/18 to be sent to Dr. Lind.  He was also set up 
with our [facility] if he is unable to get an appointment with Dr. Lind within the next week for followup.

 

PHYSICAL EXAMINATION AT THE TIME OF DISCHARGE:  Blood pressure of 124/36, heart 
rate of 76 and regular, respiratory rate 18, oxygen saturation 93% on room air, 
temperature 97.6.  General:  The patient is a very pleasant 87-year-old male, 
who is in no acute distress, alert, awake, and oriented x3.  HEENT: Head, 
atraumatic and normocephalic.  Eyes:  Pupils are equal, reactive to light and 
accommodation. Oropharynx clear.  Mucosa moist.  Neck: Supple. No JVD, no 
bruits bilaterally. Cardiovascular:  Regular rate and rhythm with 2/6 systolic 
ejection murmur noted on auscultation of the right upper sternal border 
radiating to bilateral carotids. Respiratory:  Crackles at the left lung base, 
otherwise clear.  Abdomen:  Soft, nontender.  Bowel sounds are present in all 4 
quadrants.  Extremities:  There is no edema.  Pulses +2 bilaterally.  No 
clubbing or cyanosis.  Neuro Evaluation:  Speech clear.  Cranial nerves II 
through XII grossly intact.  Motor strength is 5/5 bilaterally.

 

Please note that this is a short summary of the patient's hospitalization, 
please refer to further medical records for details.

 

TIME SPENT:  Approximately 40 minutes was spent on the patient's discharge.

 

 [id number] #: [id number]

MTDD

## 2018-12-08 ENCOUNTER — HOSPITAL ENCOUNTER (INPATIENT)
Dept: HOSPITAL 25 - ED | Age: 83
LOS: 5 days | Discharge: HOME HEALTH | DRG: 291 | End: 2018-12-13
Attending: INTERNAL MEDICINE | Admitting: HOSPITALIST
Payer: MEDICARE

## 2018-12-08 DIAGNOSIS — I27.20: ICD-10-CM

## 2018-12-08 DIAGNOSIS — R09.02: ICD-10-CM

## 2018-12-08 DIAGNOSIS — I49.3: ICD-10-CM

## 2018-12-08 DIAGNOSIS — Z87.891: ICD-10-CM

## 2018-12-08 DIAGNOSIS — R19.7: ICD-10-CM

## 2018-12-08 DIAGNOSIS — I50.23: ICD-10-CM

## 2018-12-08 DIAGNOSIS — Z87.01: ICD-10-CM

## 2018-12-08 DIAGNOSIS — I24.8: ICD-10-CM

## 2018-12-08 DIAGNOSIS — R40.2142: ICD-10-CM

## 2018-12-08 DIAGNOSIS — Z95.5: ICD-10-CM

## 2018-12-08 DIAGNOSIS — Z79.84: ICD-10-CM

## 2018-12-08 DIAGNOSIS — I35.0: ICD-10-CM

## 2018-12-08 DIAGNOSIS — E78.5: ICD-10-CM

## 2018-12-08 DIAGNOSIS — I50.9: ICD-10-CM

## 2018-12-08 DIAGNOSIS — R21: ICD-10-CM

## 2018-12-08 DIAGNOSIS — Z80.9: ICD-10-CM

## 2018-12-08 DIAGNOSIS — N17.8: ICD-10-CM

## 2018-12-08 DIAGNOSIS — N40.0: ICD-10-CM

## 2018-12-08 DIAGNOSIS — Z79.82: ICD-10-CM

## 2018-12-08 DIAGNOSIS — Z66: ICD-10-CM

## 2018-12-08 DIAGNOSIS — R40.2252: ICD-10-CM

## 2018-12-08 DIAGNOSIS — N18.9: ICD-10-CM

## 2018-12-08 DIAGNOSIS — E86.0: ICD-10-CM

## 2018-12-08 DIAGNOSIS — E11.22: ICD-10-CM

## 2018-12-08 DIAGNOSIS — R40.2362: ICD-10-CM

## 2018-12-08 DIAGNOSIS — I25.10: ICD-10-CM

## 2018-12-08 DIAGNOSIS — Z72.89: ICD-10-CM

## 2018-12-08 DIAGNOSIS — E87.2: ICD-10-CM

## 2018-12-08 DIAGNOSIS — I13.0: Primary | ICD-10-CM

## 2018-12-08 LAB
BASOPHILS # BLD AUTO: 0.1 10^3/UL (ref 0–0.2)
EOSINOPHIL # BLD AUTO: 0.2 10^3/UL (ref 0–0.6)
HCT VFR BLD AUTO: 34 % (ref 42–52)
HGB BLD-MCNC: 11.1 G/DL (ref 14–18)
LYMPHOCYTES # BLD AUTO: 0.9 10^3/UL (ref 1–4.8)
MCH RBC QN AUTO: 30 PG (ref 27–31)
MCHC RBC AUTO-ENTMCNC: 33 G/DL (ref 31–36)
MCV RBC AUTO: 92 FL (ref 80–94)
MONOCYTES # BLD AUTO: 0.6 10^3/UL (ref 0–0.8)
NEUTROPHILS # BLD AUTO: 11.1 10^3/UL (ref 1.5–7.7)
NRBC # BLD AUTO: 0 10^3/UL
NRBC BLD QL AUTO: 0.1
PLATELET # BLD AUTO: 554 10^3/UL (ref 150–450)
RBC # BLD AUTO: 3.66 10^6/UL (ref 4–5.4)
WBC # BLD AUTO: 13 10^3/UL (ref 3.5–10.8)
WBC UR QL AUTO: (no result)

## 2018-12-08 PROCEDURE — 36415 COLL VENOUS BLD VENIPUNCTURE: CPT

## 2018-12-08 PROCEDURE — 78452 HT MUSCLE IMAGE SPECT MULT: CPT

## 2018-12-08 PROCEDURE — A9502 TC99M TETROFOSMIN: HCPCS

## 2018-12-08 PROCEDURE — 87186 SC STD MICRODIL/AGAR DIL: CPT

## 2018-12-08 PROCEDURE — 87106 FUNGI IDENTIFICATION YEAST: CPT

## 2018-12-08 PROCEDURE — 99284 EMERGENCY DEPT VISIT MOD MDM: CPT

## 2018-12-08 PROCEDURE — 87077 CULTURE AEROBIC IDENTIFY: CPT

## 2018-12-08 PROCEDURE — 87045 FECES CULTURE AEROBIC BACT: CPT

## 2018-12-08 PROCEDURE — 93017 CV STRESS TEST TRACING ONLY: CPT

## 2018-12-08 PROCEDURE — 80048 BASIC METABOLIC PNL TOTAL CA: CPT

## 2018-12-08 PROCEDURE — 83880 ASSAY OF NATRIURETIC PEPTIDE: CPT

## 2018-12-08 PROCEDURE — 86140 C-REACTIVE PROTEIN: CPT

## 2018-12-08 PROCEDURE — 85025 COMPLETE CBC W/AUTO DIFF WBC: CPT

## 2018-12-08 PROCEDURE — 71046 X-RAY EXAM CHEST 2 VIEWS: CPT

## 2018-12-08 PROCEDURE — 80053 COMPREHEN METABOLIC PANEL: CPT

## 2018-12-08 PROCEDURE — 87899 AGENT NOS ASSAY W/OPTIC: CPT

## 2018-12-08 PROCEDURE — 81015 MICROSCOPIC EXAM OF URINE: CPT

## 2018-12-08 PROCEDURE — 87046 STOOL CULTR AEROBIC BACT EA: CPT

## 2018-12-08 PROCEDURE — 93005 ELECTROCARDIOGRAM TRACING: CPT

## 2018-12-08 PROCEDURE — 83605 ASSAY OF LACTIC ACID: CPT

## 2018-12-08 PROCEDURE — 94762 N-INVAS EAR/PLS OXIMTRY CONT: CPT

## 2018-12-08 PROCEDURE — 87086 URINE CULTURE/COLONY COUNT: CPT

## 2018-12-08 PROCEDURE — 81003 URINALYSIS AUTO W/O SCOPE: CPT

## 2018-12-08 PROCEDURE — G0378 HOSPITAL OBSERVATION PER HR: HCPCS

## 2018-12-08 PROCEDURE — 84484 ASSAY OF TROPONIN QUANT: CPT

## 2018-12-08 RX ADMIN — ATORVASTATIN CALCIUM SCH MG: 10 TABLET, FILM COATED ORAL at 21:32

## 2018-12-08 RX ADMIN — HEPARIN SODIUM SCH UNITS: 5000 INJECTION INTRAVENOUS; SUBCUTANEOUS at 14:13

## 2018-12-08 RX ADMIN — INSULIN LISPRO SCH UNITS: 100 INJECTION, SOLUTION INTRAVENOUS; SUBCUTANEOUS at 17:26

## 2018-12-08 RX ADMIN — HEPARIN SODIUM SCH UNITS: 5000 INJECTION INTRAVENOUS; SUBCUTANEOUS at 21:32

## 2018-12-08 RX ADMIN — METOPROLOL TARTRATE SCH MG: 25 TABLET, FILM COATED ORAL at 21:32

## 2018-12-08 NOTE — ED
Shortness of Breath





- HPI Summary


HPI Summary: 


Patient is a 87-year-old male who presents emergency department for ongoing 

shortness of breath times several days.  Patient resides at home.  He denies 

history of lung problems.  [place] [facility] [place] 

pneumonia and possible CHF.  Patient states he finished antibiotics yesterday.  

He denies fever, chills, abdominal pain, vomiting, diarrhea.  He notes 

shortness of breath with ambulation with mild chest discomfort.  Patient is 

currently comfortable sitting on stretcher.  Symptoms are moderate in severity.

  Past medical history of diabetes, CAD, hyperlipidemia.








- History of Current Complaint


Chief Complaint: EDShortnessOfBreath


Time Seen by Provider: 12/08/18 09:21





- Allergy/Home Medications


Allergies/Adverse Reactions: 


 Allergies











Allergy/AdvReac Type Severity Reaction Status Date / Time


 


No Known Allergies Allergy   Verified 12/08/18 09:13














PMH/Surg Hx/FS Hx/Imm Hx


Previously Healthy: Yes


Endocrine/Hematology History: Reports: Hx Diabetes - typeII


   Denies: Hx Thyroid Disease


Cardiovascular History: Reports: Hx Congestive Heart Failure, Hx Hypertension


Respiratory History: Reports: Other Respiratory Problems/Disorders - PNEUMONIA


   Denies: Hx Asthma


GI History: 


   Denies: Hx Ulcer


 History: Reports: Other  Problems/Disorders - BPH


Sensory History: Reports: Hx Contacts or Glasses, Hx Hearing Problem - [place]


   Denies: Hx Hearing Aid, Other Sensory Impairments


Opthamlomology History: Reports: Hx Contacts or Glasses


   Denies: Other Sensory Impairments





- Surgical History


Surgery Procedure, Year, and Place: stents


Infectious Disease History: No


Infectious Disease History: 


   Denies: Hx Hepatitis, Hx Human Immunodeficiency Virus (HIV), Hx of Known/

Suspected MRSA, Hx Shingles, Hx Tuberculosis, Traveled Outside the  in Last 

30 Days





- Family History


Known Family History: 


   Negative: Hypertension





- Social History


Occupation: Retired


Lives: With Family


Alcohol Use: Rare


Substance Use Type: Reports: None


Hx Tobacco Use: Yes


Smoking Status (MU): Former Smoker





Review of Systems


Constitutional: Negative


Negative: Fever, Chills


Positive: Chest Pain


Positive: Shortness Of Breath, Cough


Gastrointestinal: Negative


Negative: Abdominal Pain, Vomiting, Diarrhea


Genitourinary: Negative


Musculoskeletal: Negative


Skin: Negative


Neurological: Negative


All Other Systems Reviewed And Are Negative: Yes





Physical Exam


Triage Information Reviewed: Yes


Vital Signs On Initial Exam: 


 Initial Vitals











Temp Pulse Resp BP Pulse Ox


 


 98.8 F   84   20   113/46   94 


 


 12/08/18 09:10  12/08/18 09:10  12/08/18 09:10  12/08/18 09:10  12/08/18 09:10











Vital Signs Reviewed: Yes


Appearance: Positive: Well-Appearing - Patient sitting up in bed in no acute 

distress.  Pleasant.  Answers questions appropriately.  Son present.


Skin: Positive: Warm, Dry


Head/Face: Positive: Normal Head/Face Inspection


Eyes: Positive: Normal, EOMI, Conjunctiva Clear


Neck: Positive: Supple


Respiratory/Lung Sounds: Positive: Clear to Auscultation, Breath Sounds Present


Cardiovascular: Positive: Normal, RRR


Abdomen Description: Positive: Nontender, Soft


Musculoskeletal: Positive: Normal, Strength/ROM Intact


Neurological: Positive: Normal, CN Intact II-III


Psychiatric: Positive: Affect/Mood Appropriate





- [place] Coma Scale


Best Eye Response: 4 - Spontaneous


Best Motor Response: 6 - Obeys Commands


Best Verbal Response: 5 - Oriented


Coma Scale Total: 15





Diagnostics





- Vital Signs


 Vital Signs











  Temp Pulse Resp BP Pulse Ox


 


 12/08/18 10:00   81  31   93


 


 12/08/18 09:32   87  28  116/51  94


 


 12/08/18 09:20   90    96


 


 12/08/18 09:19   91   114/54  96


 


 12/08/18 09:10  98.8 F  84  20  113/46  94














- Laboratory


Lab Results: 


 Lab Results











  12/08/18 12/08/18 12/08/18 Range/Units





  09:39 09:39 09:39 


 


WBC  13.0 H    (3.5-10.8)  10^3/ul


 


RBC  3.66 L    (4.00-5.40)  10^6/ul


 


Hgb  11.1 L    (14.0-18.0)  g/dl


 


Hct  34 L    (42-52)  %


 


MCV  92    (80-94)  fL


 


MCH  30    (27-31)  pg


 


MCHC  33    (31-36)  g/dl


 


RDW  14    (10.5-15)  %


 


Plt Count  554 H D    (150-450)  10^3/ul


 


MPV  6.7 L    (7.4-10.4)  fL


 


Neut % (Auto)  85.3    %


 


Lymph % (Auto)  7.3    %


 


Mono % (Auto)  4.9    %


 


Eos % (Auto)  1.4    %


 


Baso % (Auto)  1.1    %


 


Absolute Neuts (auto)  11.1 H    (1.5-7.7)  10^3/ul


 


Absolute Lymphs (auto)  0.9 L    (1.0-4.8)  10^3/ul


 


Absolute Monos (auto)  0.6    (0-0.8)  10^3/ul


 


Absolute Eos (auto)  0.2    (0-0.6)  10^3/ul


 


Absolute Basos (auto)  0.1    (0-0.2)  10^3/ul


 


Absolute Nucleated RBC  0    10^3/ul


 


Nucleated RBC %  0.1    


 


Sodium   135   (135-145)  mmol/L


 


Potassium   4.5   (3.5-5.0)  mmol/L


 


Chloride   96 L   (101-111)  mmol/L


 


Carbon Dioxide   30   (22-32)  mmol/L


 


Anion Gap   9   (2-11)  mmol/L


 


BUN   58 H   (6-24)  mg/dL


 


Creatinine   2.21 H   (0.67-1.17)  mg/dL


 


Est GFR ( Amer)   34.3   (>60)  


 


Est GFR (Non-Af Amer)   28.3   (>60)  


 


BUN/Creatinine Ratio   26.2 H   (8-20)  


 


Glucose   383 H   ()  mg/dL


 


Lactic Acid    2.8 H*  (0.5-2.0)  mmol/L


 


Calcium   9.7   (8.6-10.3)  mg/dL


 


Total Bilirubin   0.40   (0.2-1.0)  mg/dL


 


AST   32   (13-39)  U/L


 


ALT   104 H   (7-52)  U/L


 


Alkaline Phosphatase   148 H   ()  U/L


 


Troponin I   0.28 H*   (<0.04)  ng/mL


 


C-Reactive Protein   52.19 H   (<8.01)  mg/L


 


B-Natriuretic Peptide     (<=100)  pg/mL


 


Total Protein   7.0   (6.4-8.9)  g/dL


 


Albumin   3.1 L   (3.2-5.2)  g/dL


 


Globulin   3.9   (2-4)  g/dL


 


Albumin/Globulin Ratio   0.8 L   (1-3)  














  12/08/18 Range/Units





  09:39 


 


WBC   (3.5-10.8)  10^3/ul


 


RBC   (4.00-5.40)  10^6/ul


 


Hgb   (14.0-18.0)  g/dl


 


Hct   (42-52)  %


 


MCV   (80-94)  fL


 


MCH   (27-31)  pg


 


MCHC   (31-36)  g/dl


 


RDW   (10.5-15)  %


 


Plt Count   (150-450)  10^3/ul


 


MPV   (7.4-10.4)  fL


 


Neut % (Auto)   %


 


Lymph % (Auto)   %


 


Mono % (Auto)   %


 


Eos % (Auto)   %


 


Baso % (Auto)   %


 


Absolute Neuts (auto)   (1.5-7.7)  10^3/ul


 


Absolute Lymphs (auto)   (1.0-4.8)  10^3/ul


 


Absolute Monos (auto)   (0-0.8)  10^3/ul


 


Absolute Eos (auto)   (0-0.6)  10^3/ul


 


Absolute Basos (auto)   (0-0.2)  10^3/ul


 


Absolute Nucleated RBC   10^3/ul


 


Nucleated RBC %   


 


Sodium   (135-145)  mmol/L


 


Potassium   (3.5-5.0)  mmol/L


 


Chloride   (101-111)  mmol/L


 


Carbon Dioxide   (22-32)  mmol/L


 


Anion Gap   (2-11)  mmol/L


 


BUN   (6-24)  mg/dL


 


Creatinine   (0.67-1.17)  mg/dL


 


Est GFR ( Amer)   (>60)  


 


Est GFR (Non-Af Amer)   (>60)  


 


BUN/Creatinine Ratio   (8-20)  


 


Glucose   ()  mg/dL


 


Lactic Acid   (0.5-2.0)  mmol/L


 


Calcium   (8.6-10.3)  mg/dL


 


Total Bilirubin   (0.2-1.0)  mg/dL


 


AST   (13-39)  U/L


 


ALT   (7-52)  U/L


 


Alkaline Phosphatase   ()  U/L


 


Troponin I   (<0.04)  ng/mL


 


C-Reactive Protein   (<8.01)  mg/L


 


B-Natriuretic Peptide  303 H  (<=100)  pg/mL


 


Total Protein   (6.4-8.9)  g/dL


 


Albumin   (3.2-5.2)  g/dL


 


Globulin   (2-4)  g/dL


 


Albumin/Globulin Ratio   (1-3)  











Result Diagrams: 


 12/08/18 09:39





 12/08/18 09:39


Lab Statement: Any lab studies that have been ordered have been reviewed, and 

results considered in the medical decision making process.





Course/Dx





- Course


Course Of Treatment: Patient presenting with ongoing shortness of breath after 

recent admission for pneumonia and CHF.  Afebrile and well-appearing.  He is in 

no respiratory distress sitting up in stretcher.  We will recheck basic blood 

work, EKG, chest x-ray.  CBC shows mild acidosis of 13.  Increase in creatinine 

and BUN at 58 and 2.21, glucose 383, lactic acid is 2.8, troponin is 0.28, BNP 

3 of 3.  EKG done at 0935 shows a sinus rhythm of 86 bpm, normal axis, minimal 

ST elevation in anterior leads, similar to prior tracings.  Chest x-ray shows 

probable pulmonary edema and likely left hemothorax for effusion that is 

slightly smaller compared to last x-ray, reading per radiology.  Given elevated 

lactate acid and creatinine we'll start patient on liter of fluids.  Given 

troponin elevation and dehydration will admit.  I spoke with hospitalist, Dr. Noel, and patient has been admitted to her service.  Results and plan 

discussed with patient and son & agree.





- Diagnoses


Provider Diagnoses: 


 Dehydration, Acute kidney injury, Elevated troponin I level








Discharge





- Sign-Out/Discharge


Documenting (check all that apply): Patient Departure


All imaging exams completed and their final reports reviewed: Yes





- Discharge Plan


Condition: Stable


Disposition: ADMITTED TO [facility]





- Billing Disposition and Condition


Condition: STABLE


Disposition: Admitted to [facility]

## 2018-12-09 LAB
BASOPHILS # BLD AUTO: 0.1 10^3/UL (ref 0–0.2)
EOSINOPHIL # BLD AUTO: 0.2 10^3/UL (ref 0–0.6)
HCT VFR BLD AUTO: 32 % (ref 42–52)
HGB BLD-MCNC: 10.3 G/DL (ref 14–18)
LYMPHOCYTES # BLD AUTO: 1.6 10^3/UL (ref 1–4.8)
MCH RBC QN AUTO: 30 PG (ref 27–31)
MCHC RBC AUTO-ENTMCNC: 33 G/DL (ref 31–36)
MCV RBC AUTO: 92 FL (ref 80–94)
MONOCYTES # BLD AUTO: 0.7 10^3/UL (ref 0–0.8)
NEUTROPHILS # BLD AUTO: 9 10^3/UL (ref 1.5–7.7)
NRBC # BLD AUTO: 0 10^3/UL
NRBC BLD QL AUTO: 0.1
PLATELET # BLD AUTO: 511 10^3/UL (ref 150–450)
RBC # BLD AUTO: 3.46 10^6/UL (ref 4–5.4)
WBC # BLD AUTO: 11.7 10^3/UL (ref 3.5–10.8)

## 2018-12-09 RX ADMIN — HEPARIN SODIUM SCH UNITS: 5000 INJECTION INTRAVENOUS; SUBCUTANEOUS at 21:14

## 2018-12-09 RX ADMIN — INSULIN LISPRO SCH UNITS: 100 INJECTION, SOLUTION INTRAVENOUS; SUBCUTANEOUS at 12:01

## 2018-12-09 RX ADMIN — HEPARIN SODIUM SCH UNITS: 5000 INJECTION INTRAVENOUS; SUBCUTANEOUS at 13:33

## 2018-12-09 RX ADMIN — INSULIN LISPRO SCH UNITS: 100 INJECTION, SOLUTION INTRAVENOUS; SUBCUTANEOUS at 17:01

## 2018-12-09 RX ADMIN — INSULIN LISPRO SCH UNITS: 100 INJECTION, SOLUTION INTRAVENOUS; SUBCUTANEOUS at 08:41

## 2018-12-09 RX ADMIN — INSULIN LISPRO SCH UNIT: 100 INJECTION, SOLUTION INTRAVENOUS; SUBCUTANEOUS at 21:51

## 2018-12-09 RX ADMIN — CARVEDILOL SCH MG: 3.12 TABLET, FILM COATED ORAL at 21:13

## 2018-12-09 RX ADMIN — ASPIRIN 325 MG ORAL TABLET SCH MG: 325 PILL ORAL at 08:40

## 2018-12-09 RX ADMIN — METOPROLOL TARTRATE SCH MG: 25 TABLET, FILM COATED ORAL at 08:41

## 2018-12-09 RX ADMIN — ATORVASTATIN CALCIUM SCH MG: 10 TABLET, FILM COATED ORAL at 21:13

## 2018-12-09 RX ADMIN — TAMSULOSIN HYDROCHLORIDE SCH MG: 0.4 CAPSULE ORAL at 08:41

## 2018-12-09 RX ADMIN — CYANOCOBALAMIN TAB 500 MCG SCH MCG: 500 TAB at 08:51

## 2018-12-09 RX ADMIN — HEPARIN SODIUM SCH UNITS: 5000 INJECTION INTRAVENOUS; SUBCUTANEOUS at 05:10

## 2018-12-09 RX ADMIN — FOLIC ACID SCH MG: 1 TABLET ORAL at 08:41

## 2018-12-09 NOTE — PN
Subjective


Date of Service: 12/09/18


Interval History: 





Patient seen and examined at bedside. Denies fever, chills, chest discomfort, N/

V/D. He reports that his dyspnea is improving and is on room air. We discussed 

getting a cardiac stress test and he does not want one. He is unsure what his 

baseline renal status is. We discussed code status, he would like to have a 

discussion with his son Frankie when he arrives later today.





Tele: Sinus rhythm with PVCs, rate 80's





Family History: Unchanged from Admission


Social History: Unchanged from Admission


Past Medical History: Unchanged from Admission





Objective


Active Medications: 





Acetaminophen (Tylenol Tab*)  650 mg PO Q4H PRN Reason: FEVER/PAIN


Albuterol (Ventolin 2.5 Mg/3 Ml Neb.Sol*)  2.5 mg INH Q4H PRN Reason: SOB/

WHEEZING


Aspirin (Aspirin Tab*)  325 mg PO DAILY Count includes the [facility]


Atorvastatin Calcium (Lipitor*)  10 mg PO BEDTIME [facility]; Protocol


Cyanocobalamin (Vitamin B12 Tab*)  1,000 mcg PO DAILY Count includes the [facility]


Dextrose (D50w Syringe 50 Ml*)  12.5 gm IV PUSH .FOR FS < 60 - SS PRN Reason: 

FS < 60


Folic Acid (Folvite Tab*)  1 mg PO DAILY Count includes the [facility]


Guaifenesin (Robitussin*)  5 ml PO Q4H PRN Reason: COUGH


Heparin Sodium (Porcine) (Heparin Vial(*))  5,000 units SUBCUT Q8HR [facility]


Insulin Human Lispro (Humalog*)  0 units SUBCUT AC [facility]; Protocol


Metoprolol Tartrate (Lopressor Tab*)  25 mg PO Q12HR Count includes the [facility]


Ondansetron HCl (Zofran Inj*)  4 mg IV Q6H PRN Reason: NAUSEA/VOMITING


Tamsulosin HCl (Flomax Cap*)  0.8 mg PO DAILY Count includes the [facility]





 Vital Signs - 8 hr











  12/09/18 12/09/18 12/09/18





  07:24 07:40 11:36


 


Temperature 98.3 F  97.3 F


 


Pulse Rate 77  75


 


Respiratory 20 22 20





Rate   


 


Blood Pressure 106/55  95/45





(mmHg)   


 


O2 Sat by Pulse 99  94





Oximetry   











Oxygen Devices in Use Now: None


Appearance: NAD, laying in bed


Ears/Nose/Mouth/Throat: Mucous Membranes Moist


Respiratory: Symmetrical Chest Expansion and Respiratory Effort, Clear to 

Auscultation - , diminished


Cardiovascular: RRR, - - Systolic murmur heard best at the right upper sternal 

border


Abdominal: NL Sounds; No Tenderness; No Distention


Extremities: No Edema


Skin: No Rash or Ulcers


Neurological: Alert and Oriented x 3, NL Muscle Strength and Tone


Lines/Tubes/Other Access: Clean, Dry and Intact Peripheral IV - site benign


Nutrition: Taking PO's


Result Diagrams: 


 12/09/18 05:38





 12/09/18 05:38


Additional Lab and Data: 


 





Microbiology and Other Data: 


 Microbiology











 12/08/18 13:20 Stool Gross Appearance - Final





 Stool 














Assess/Plan/Problems-Billing


Assessment: 





Mr. Langston is a 86 yo male with PMH significant for systolic HF, CKD, CAD, DM2, 

HTN, HLD, BPH, and recent PNA who presented to the emergency room with 

shortness of breath with exertion, CHLOE, elevated troponin and lactic acidosis. 








- Patient Problems


(1) Dyspnea


Code(s): R06.00 - DYSPNEA, UNSPECIFIED   SNOMED Code(s): 212252340


   Comment: 


- Improving


- Suspect this is residual from his recent PNA   





(2) CHLOE (acute kidney injury)


Code(s): N17.9 - ACUTE KIDNEY FAILURE, UNSPECIFIED   SNOMED Code(s): 21950110


   Comment: 


- Creatinine improving, unclear baseline as no labs prior to 11/30


- Suspect this is pre-renal and secondary to dehydration and diarrhea


- Received IVFs in the ED


- Will recheck labs in the AM   





(3) Elevated troponin


Code(s): R74.8 - ABNORMAL LEVELS OF OTHER SERUM ENZYMES   SNOMED Code(s): 

306771098


   Comment: 


- Denies chest pain


- Troponin has been trending down since admission; 0.28, 0.25, 0.21


- Echo on 11/30 with multiple regional wall motion abnormalities and EF 30%


- Suspect demand ischemia in the setting of recent PNA   





(4) Diarrhea


Code(s): R19.7 - DIARRHEA, UNSPECIFIED   SNOMED Code(s): 32685910


   Comment: 


- Suspect secondary to recent ABX use


- Stool culture pending   





(5) Systolic heart failure


Code(s): I50.20 - UNSPECIFIED SYSTOLIC (CONGESTIVE) HEART FAILURE   SNOMED Code(

s): 579129345


   Comment: 


- Will ask cardiology to consult


- Will start low dose Lasix


- Daily weights and strict I+O's   





(6) Pulmonary HTN


Code(s): I27.20 - PULMONARY HYPERTENSION, UNSPECIFIED   SNOMED Code(s): 33157847


   Comment: 


- Severe pulmonary HTN on echo today


- Consider pulmonology consult in the AM   





(7) HTN (hypertension)


Code(s): I10 - ESSENTIAL (PRIMARY) HYPERTENSION   SNOMED Code(s): 74242263


   Comment: 


- SBP 's


- Continue betablocker, will change from metoprolol to Coreg with CHF dx


- Hold Lisinopril in the setting of CHLOE   





(8) Lactic acidosis


Code(s): E87.2 - ACIDOSIS   SNOMED Code(s): 64541405


   Comment: 


- Trending down   





(9) BPH (benign prostatic hyperplasia)


Code(s): N40.0 - BENIGN PROSTATIC HYPERPLASIA WITHOUT LOWER URINRY TRACT SYMP   

SNOMED Code(s): 941447560


   Comment: 


- Continue flomax   





(10) CAD (coronary artery disease)


Code(s): I25.10 - ATHSCL HEART DISEASE OF NATIVE CORONARY ARTERY W/O ANG PCTRS 

  SNOMED Code(s): 54646859


   Comment: 


- Reported 4 stents (2002)


- Former smoker (quit 18 years ago)


- Will change metoprolol to Coreg in the setting of CHF


   





(11) Diabetes mellitus


Code(s): E11.9 - TYPE 2 DIABETES MELLITUS WITHOUT COMPLICATIONS   SNOMED Code(s)

: 22506432


   Comment: 


- Glucose 180-280's


- HgA1C was 7.1% on 12/2


- Continue Lispro sliding scale


- Hold glipizide   





(12) HLD (hyperlipidemia)


Code(s): E78.5 - HYPERLIPIDEMIA, UNSPECIFIED   SNOMED Code(s): 13512620


   Comment: 


- Continue atorvastatin   





(13) DVT prophylaxis


Code(s): RDA6922 -    SNOMED Code(s): 099924233


   Comment: 


- HSQ   





(14) DNR (do not resuscitate)


Comment:  


- MOSLT completed and placed on the chart   


Status and Disposition: 





OBV. Discharge to home when medically stable, suspect as early as the AM, but 

may require 2-3 day stay.





Attending: Aundrea Herring

## 2018-12-10 LAB
BASOPHILS # BLD AUTO: 0.1 10^3/UL (ref 0–0.2)
EOSINOPHIL # BLD AUTO: 0.3 10^3/UL (ref 0–0.6)
HCT VFR BLD AUTO: 33 % (ref 42–52)
HGB BLD-MCNC: 10.7 G/DL (ref 14–18)
LYMPHOCYTES # BLD AUTO: 1.6 10^3/UL (ref 1–4.8)
MCH RBC QN AUTO: 30 PG (ref 27–31)
MCHC RBC AUTO-ENTMCNC: 32 G/DL (ref 31–36)
MCV RBC AUTO: 91 FL (ref 80–94)
MONOCYTES # BLD AUTO: 0.7 10^3/UL (ref 0–0.8)
NEUTROPHILS # BLD AUTO: 9.9 10^3/UL (ref 1.5–7.7)
NRBC # BLD AUTO: 0 10^3/UL
NRBC BLD QL AUTO: 0
PLATELET # BLD AUTO: 532 10^3/UL (ref 150–450)
RBC # BLD AUTO: 3.62 10^6/UL (ref 4–5.4)
WBC # BLD AUTO: 12.5 10^3/UL (ref 3.5–10.8)

## 2018-12-10 RX ADMIN — CARVEDILOL SCH MG: 3.12 TABLET, FILM COATED ORAL at 21:14

## 2018-12-10 RX ADMIN — HEPARIN SODIUM SCH UNITS: 5000 INJECTION INTRAVENOUS; SUBCUTANEOUS at 13:56

## 2018-12-10 RX ADMIN — CARVEDILOL SCH MG: 3.12 TABLET, FILM COATED ORAL at 09:01

## 2018-12-10 RX ADMIN — FUROSEMIDE SCH MG: 20 TABLET ORAL at 09:00

## 2018-12-10 RX ADMIN — CYANOCOBALAMIN TAB 500 MCG SCH MCG: 500 TAB at 09:01

## 2018-12-10 RX ADMIN — HEPARIN SODIUM SCH UNITS: 5000 INJECTION INTRAVENOUS; SUBCUTANEOUS at 05:55

## 2018-12-10 RX ADMIN — INSULIN LISPRO SCH UNIT: 100 INJECTION, SOLUTION INTRAVENOUS; SUBCUTANEOUS at 12:16

## 2018-12-10 RX ADMIN — ASPIRIN 325 MG ORAL TABLET SCH MG: 325 PILL ORAL at 09:01

## 2018-12-10 RX ADMIN — FOLIC ACID SCH MG: 1 TABLET ORAL at 09:00

## 2018-12-10 RX ADMIN — ATORVASTATIN CALCIUM SCH MG: 10 TABLET, FILM COATED ORAL at 21:15

## 2018-12-10 RX ADMIN — INSULIN LISPRO SCH UNIT: 100 INJECTION, SOLUTION INTRAVENOUS; SUBCUTANEOUS at 17:00

## 2018-12-10 RX ADMIN — TAMSULOSIN HYDROCHLORIDE SCH MG: 0.4 CAPSULE ORAL at 09:06

## 2018-12-10 RX ADMIN — HEPARIN SODIUM SCH UNITS: 5000 INJECTION INTRAVENOUS; SUBCUTANEOUS at 21:11

## 2018-12-10 RX ADMIN — INSULIN LISPRO SCH UNIT: 100 INJECTION, SOLUTION INTRAVENOUS; SUBCUTANEOUS at 21:13

## 2018-12-10 RX ADMIN — INSULIN LISPRO SCH UNIT: 100 INJECTION, SOLUTION INTRAVENOUS; SUBCUTANEOUS at 09:01

## 2018-12-10 NOTE — PN
Subjective


Date of Service: 12/10/18


Interval History: 





Patient seen and examined at bedside. Denies fever, chills, shortness of breath

, chest discomfort, N/V/D. He states that he has dyspnea with exertion. 

Declined stress testing. 





Tele: Sinus rhythm - sinus arrhythmia, rate 80's with PVCs.





Family History: Unchanged from Admission


Social History: Unchanged from Admission


Past Medical History: Unchanged from Admission





Objective


Active Medications: 





Acetaminophen (Tylenol Tab*)  650 mg PO Q4H PRN Reason: FEVER/PAIN


Albuterol (Ventolin 2.5 Mg/3 Ml Neb.Sol*)  2.5 mg INH Q4H PRN Reason: SOB/

WHEEZING


Aspirin (Aspirin Tab*)  325 mg PO DAILY [facility]


Atorvastatin Calcium (Lipitor*)  10 mg PO BEDTIME [facility]; Protocol


Carvedilol (Coreg Tab*)  3.125 mg PO BID [facility]


Cyanocobalamin (Vitamin B12 Tab*)  1,000 mcg PO DAILY [facility]


Dextrose (D50w Syringe 50 Ml*)  12.5 gm IV PUSH .FOR FS < 60 - SS PRN Reason: 

FS < 60


Folic Acid (Folvite Tab*)  1 mg PO DAILY [facility]


Furosemide (Lasix Tab*)  20 mg PO DAILY [facility]


Guaifenesin (Robitussin*)  5 ml PO Q4H PRN Reason: COUGH


Heparin Sodium (Porcine) (Heparin Vial(*))  5,000 units SUBCUT Q8HR [facility]


Insulin Human Lispro (Humalog*)  0 units SUBCUT ACHS [facility]; Protocol


Ondansetron HCl (Zofran Inj*)  4 mg IV Q6H PRN Reason: NAUSEA/VOMITING


Tamsulosin HCl (Flomax Cap*)  0.8 mg PO DAILY [facility]





 Vital Signs - 8 hr











  12/10/18 12/10/18 12/10/18





  07:17 11:13 11:16


 


Temperature 98.0 F 98.4 F 98.4 F


 


Pulse Rate 81 78 77


 


Respiratory 19 18 20





Rate   


 


Blood Pressure 122/56  





(mmHg)   


 


O2 Sat by Pulse 95 94 96





Oximetry   














  12/10/18





  11:32


 


Temperature 


 


Pulse Rate 77


 


Respiratory 





Rate 


 


Blood Pressure 101/48





(mmHg) 


 


O2 Sat by Pulse 





Oximetry 











Oxygen Devices in Use Now: None


Appearance: NAD, laying in bed


Ears/Nose/Mouth/Throat: Mucous Membranes Moist


Respiratory: Symmetrical Chest Expansion and Respiratory Effort, - - Crackles 

in the left base.


Cardiovascular: RRR


Extremities: - - 1+ bilateral LE edema


Skin: No Rash or Ulcers


Neurological: Alert and Oriented x 3, NL Muscle Strength and Tone


Lines/Tubes/Other Access: Clean, Dry and Intact Peripheral IV - site benign


Nutrition: Taking PO's


Result Diagrams: 


 12/10/18 05:27





 12/10/18 05:27


Additional Lab and Data: 


 





Microbiology and Other Data: 


 Microbiology











 12/08/18 13:20 Stool Gross Appearance - Final





 Stool 














Assess/Plan/Problems-Billing


Assessment: 





Mr. Langston is a 88 yo male with PMH significant for systolic HF, CKD, CAD, DM2, 

HTN, HLD, BPH, and recent PNA who presented to the emergency room with 

shortness of breath with exertion, CHLOE, elevated troponin and lactic acidosis. 








- Patient Problems


(1) Dyspnea


Code(s): R06.00 - DYSPNEA, UNSPECIFIED   SNOMED Code(s): 339408639


   Comment: 


- Improving


- Suspect this is residual from his recent PNA and CHF, ACS is also in the diff 

dx.   





(2) CHLOE (acute kidney injury)


Code(s): N17.9 - ACUTE KIDNEY FAILURE, UNSPECIFIED   SNOMED Code(s): 61091873


   Comment: 


- Creatinine improving, unclear baseline as no labs prior to 11/30


- Suspect this is pre-renal and secondary to dehydration and diarrhea


- Received IVFs in the ED   





(3) Elevated troponin


Code(s): R74.8 - ABNORMAL LEVELS OF OTHER SERUM ENZYMES   SNOMED Code(s): 

178421544


   Comment: 


- Denies chest pain


- Troponin has been trending down since admission; 0.28, 0.25, 0.21


- Echo on 11/30 with multiple regional wall motion abnormalities and EF 30%


- Suspect demand ischemia in the setting of recent PNA and CHF


- Further recommendations based on Cardiology consult   





(4) Diarrhea


Code(s): R19.7 - DIARRHEA, UNSPECIFIED   SNOMED Code(s): 60015505


   Comment: 


- Suspect secondary to recent ABX use


- Stool culture pending   





(5) Systolic heart failure


Code(s): I50.20 - UNSPECIFIED SYSTOLIC (CONGESTIVE) HEART FAILURE   SNOMED Code(

s): 182277539


   Comment: 


- Echo on 11/30, EF 30%


- Cardiology consult, pending


- Continue low dose Lasix and Coreg


- Daily weights and strict I+O's   





(6) Pulmonary HTN


Code(s): I27.20 - PULMONARY HYPERTENSION, UNSPECIFIED   SNOMED Code(s): 83551414


   Comment: 


- Severe pulmonary HTN on echo


- Pulmonology consult, pending   





(7) HTN (hypertension)


Code(s): I10 - ESSENTIAL (PRIMARY) HYPERTENSION   SNOMED Code(s): 91168204


   Comment: 


- -120's


- Continue Coreg 


- Hold Lisinopril in the setting of CHLOE   





(8) Lactic acidosis


Code(s): E87.2 - ACIDOSIS   SNOMED Code(s): 52623653


   Comment: 


- Trending down   





(9) BPH (benign prostatic hyperplasia)


Code(s): N40.0 - BENIGN PROSTATIC HYPERPLASIA WITHOUT LOWER URINRY TRACT SYMP   

SNOMED Code(s): 281126801


   Comment: 


- Continue flomax   





(10) CAD (coronary artery disease)


Code(s): I25.10 - ATHSCL HEART DISEASE OF NATIVE CORONARY ARTERY W/O ANG PCTRS 

  SNOMED Code(s): 11997199


   Comment: 


- Reported 4 stents (2002)


- Former smoker (quit 18 years ago)


- Continue Coreg (changed from metoprolol in the setting of CHF)


   





(11) Diabetes mellitus


Code(s): E11.9 - TYPE 2 DIABETES MELLITUS WITHOUT COMPLICATIONS   SNOMED Code(s)

: 41747584


   Comment: 


- Glucose 180-310's


- HgA1C was 7.1% on 12/2


- Continue Lispro sliding scale


- Hold glipizide   





(12) HLD (hyperlipidemia)


Code(s): E78.5 - HYPERLIPIDEMIA, UNSPECIFIED   SNOMED Code(s): 62512284


   Comment: 


- Continue atorvastatin   





(13) DVT prophylaxis


Code(s): HUH5759 -    SNOMED Code(s): 432085388


   Comment: 


- HSQ   





(14) DNR (do not resuscitate)


Comment:  


- MOSLT completed and placed on the chart   


Status and Disposition: 





OBV to Inpatient. Discharge to home when medically stable, suspect as early as 

the AM, but may require 2-3 day stay.





Attending: Stoney Morris

## 2018-12-10 NOTE — CONS
PULMONARY CONSULTATION REPORT:

 

DATE OF CONSULT:  12/10/18

 

CONSULTATION REQUESTED BY:  Patsy Sanchez NP

 

REASON FOR CONSULT:  Evaluation of pulmonary hypertension.

 

HISTORY OF PRESENT ILLNESS:  The patient is an 87-year-old male with history of 
recent pneumonia, requiring recent hospitalization, history of systolic heart 
failure, acute on chronic renal disease during recent hospitalization.  The 
patient was discharged on 12/05/18, after he had been treated for community-
acquired pneumonia with associated sepsis, systolic heart failure, and acute on 
chronic renal failure.  The patient had improvement in his symptoms and was 
discharged home.  He presents for evaluation of worsening shortness of breath 
and fatigue. Further evaluation revealed significant systolic heart failure, 
which is being treated.  The patient had echocardiogram that showed evidence of 
moderate-to-severe pulmonary hypertension and pulmonary consultation was 
requested.  The patient was seen and examined at bedside.  The patient reports 
slight improvement in his shortness of breath.  The patient reports that he 
would get winded with minimal movement.  Denies significant cough or sputum 
production. Cough is mostly dry in nature when it happens.  Denies chest pain, 
palpitations, or dizziness. Denies lower extremity edema. Reports diarrhea. 
Denies nausea or vomiting.  Reports increased urination.  Denies dysuria.  
Denies generalized weakness, trouble swallowing, joint or muscle aches.  Denies 
any rash.

 

PAST MEDICAL HISTORY:

1.  Recent community-acquired pneumonia.

2.  Systolic congestive heart failure with recent acute exacerbation.

3.  Acute on chronic renal disease.

4.  History of hypoxemic respiratory failure and sepsis during recent 
hospitalization.

5.  Coronary artery disease, status post stent placement.

6.  Type 2 diabetes.

7.  Hypertension.

8.  Dyslipidemia.

9.  Prostate hyperplasia.

 

MEDICATIONS AT HOME:

1.  Guaifenesin.

2.  Glipizide.

3.  Flomax.

4.  Pravastatin.

5.  Nitroglycerin.

6.  Metoprolol.

7.  Lisinopril.

8.  Furosemide.

9.  Folic acid.

10.  Vitamin B12.

11.  Aspirin.

12.  Augmentin.

 

ALLERGIES:  No known drug allergies.

 

FAMILY HISTORY:  Two brothers with cancer.

 

SOCIAL HISTORY:  Former smoker, quit approximately 18 years ago.  Occasional 
alcohol intake and denies drug usage.  Lives alone at home.

 

REVIEW OF SYSTEMS:  All 14 systems were reviewed as per HPI.  Other findings 
that were not mentioned in HPI include history of snoring, disruptive sleep and 
nocturia, and daytime fatigue.

 

PHYSICAL EXAM:  The patient in bed, in no apparent distress.  Vital Signs: 
Temperature 97.6, pulse 78 beats per minute, respiratory rate 16 per minute, O2 
sat 93% on room air, blood pressure 114/54.  HEENT:  Pupils equal, reactive to 
light. Mucous membranes moist.  Lungs:  Diminished at bases and expiratory 
crackles at left base.  Cardiovascular:  S1, S2 present, regular.  Grade II/VI 
systolic murmur. Abdomen:  Soft, nontender, nondistended.  Bowel sounds 
present.  Extremities: Normal range of motion.  No edema.  Neuro:  No focal 
defects.  Alert, awake, oriented x3.  Skin:  No rash.

 

DIAGNOSTIC STUDIES/LAB DATA:  WBC count 12.5, hemoglobin 10.7, hematocrit 33, 
and platelet count 532.  Sodium 140, potassium 5, chloride 104, bicarb 30, BUN 
36, creatinine 1.57, glucose 185, calcium 9.8.  Influenza A and B negative.

 

Chest x-ray was personally reviewed by me, evidence of pulmonary vascular 
congestion bilaterally, improving infiltrate in the left lung, small pleural 
effusion on the left side.

 

Echocardiogram from 11/30/18, showed evidence of low ejection fraction with EF 
of 30%, dilated left atrium, mild-to-moderate aortic stenosis, and evidence of 
moderate-to-severe pulmonary hypertension.

 

IMPRESSION AND RECOMMENDATIONS:  87-year-old male with evidence of pulmonary 
hypertension, which is moderate to severe in the setting of recent pneumonia; 
acute on chronic systolic heart failure; valvular abnormalities; pulmonary 
hypertension is likely to be group 2, however, cannot rule out group 3 
pulmonary hypertension; does have body habitus concerning for possible 
obstructive sleep apnea.

 

Recommend optimization of underlying cardiac condition.  Also, recommend 
overnight oximetry for re-evaluation.

 

I do not think he would have group 1 pulmonary hypertension at this age.  No 
other workup is necessary at this time for evaluation of pulmonary hypertension.

 

Further recommendations pending overnight oximetry results.

 

Thank you for allowing me to participate in the care of your patient.  Will 
follow up with you.

 

Above recommendations were discussed with Patsy Sanchez.

 

 [id number]/CPS #: [id number]

SAAD

## 2018-12-10 NOTE — CONS
CARDIOLOGY CONSULTATION:

 

DATE OF CONSULT:  12/10/18

 

INDICATION FOR CONSULTATION:  Shortness of breath, history of coronary artery 
disease.

 

HISTORY OF PRESENT ILLNESS:  The patient is an 87-year-old gentleman with a 
history of coronary artery disease, mild renal insufficiency, congestive heart 
failure, who has been to the hospital because of shortness of breath.

 

The patient was admitted to the hospital at the end of November with community- 
acquired pneumonia.  He was treated with antibiotics and discharged home.  He 
was readmitted to the hospital yesterday because of increased shortness of 
breath.

 

In speaking with the patient, he does state that he has been having increasing 
dyspnea on exertion for the past couple of months.  He says that this summer, 
he had noticed some decrease in his exercise tolerance.  The patient had been 
very busy taking care of his ill wife.  She passed away at the beginning of 
October. Since then, he has noticed that his overall exertion tolerance has 
decreased.  He says that he gets up and he walks around his house and then has 
to sit down to catch his breath.  He denies any anginal type symptoms.  He 
denies any orthopnea. He denies any palpitations.  No lightheadedness, dizziness
, or syncope.

 

The patient was admitted to the hospital now because of his shortness of 
breath. He has a minimally elevated troponin level at 0.2.  The telemetry 
monitor has shown ventricular bigeminy, but no sustained arrhythmias.

 

The patient did get an echocardiogram during his initial hospitalization back 
at the end of November, which demonstrated moderately reduced LV systolic 
function, ejection fraction of 30% with focal wall motion abnormalities.  He 
did have thickening of the aortic valve with a mean gradient of 10 mmHg, but 
this could be under estimating the severity of the aortic stenosis.  He had 
moderate mitral regurgitation and moderate-to-severe pulmonary hypertension.

 

PAST MEDICAL HISTORY:  Significant for coronary artery disease, cardiac 
catheterization in 2002 revealed 3-vessel coronary artery disease.  He was 
transferred up to [facility] for intervention and 
possible bypass.  I am unclear as to what happened after his catheterization 
here at [facility] in 2002.  [facility]  Other past medical history is hypertension, hyperlipidemia, 
diabetes.

 

OUTPATIENT MEDICATIONS:

1.  Aspirin 325 a day.

2.  Folic acid 1 mg a day.

3.  Glucotrol 5 mg a day.

4.  Guaifenesin 400 mg as needed.

5.  Pravachol 40 mg a day.

6.  Flomax 0.8 mg a day.

7.  Lasix 40 mg a day.

8.  Metoprolol 25 mg every 12 hours.

 

His current medications are the same except that his beta blocker has been 
changed to Coreg 3.125 mg b.i.d.

 

ALLERGIES:  No known drug allergies.

 

FAMILY HISTORY:  Noncontributory.

 

SOCIAL HISTORY:  He is .  He denies tobacco or alcohol use.  He lives in 
his own house.  He is retired.

 

REVIEW OF SYSTEMS:  Positive for shortness of breath.  Negative for fevers and 
chills.  Negative for changes in weight.  Negative for changes in bowel or 
bladder habit.

 

PHYSICAL EXAM:  Height is 5 feet 6 inches, weight is 177 pounds, temperature 
98.4, heart rate is 77, blood pressure 122/56, respiratory rate is 18, oxygen 
saturation 96% on room air.  Sclerae anicteric.  Oropharynx is pink without 
erythema. Carotids are 2+ without any bruits.  JVD is normal.  Thyroid is 
normal.  Cardiac Exam:  S1, S2, with a 1/6 systolic ejection murmur heard best 
at the right upper sternal border.  PMI is displaced laterally.  Lungs have 
mild rhonchi.  There is some mild rales at the bases.  There is no dullness to 
percussion.  Abdomen is soft, nontender, nondistended with normoactive bowel 
sounds.  Extremities show no edema.  He has 2+ pulses throughout.  The patient 
is awake, alert, and oriented.

 

DIAGNOSTIC STUDIES/LAB DATA:  White count 12.5, hemoglobin 10, hematocrit 33, 
platelet count 532.  Chemistries within normal limits.  BUN 36, creatinine 1.5 
which is about his baseline.  BNP was 303.  AST and ALT are normal.

 

IMPRESSION:  This is an 87-year-old gentleman who is admitted to the hospital 
because of shortness of breath.  The patient did have community-acquired 
pneumonia back at the end of November for which he finished a complete 
antibiotic course.

 

In speaking with the patient, his major complaint is just shortness of breath 
and dyspnea.

 

The patient does have moderate-to-severe left ventricular dysfunction.  His 
ejection fraction is 30% with focal wall motion abnormalities.

 

RECOMMENDATIONS:  For now, my recommendation is to maximize medical therapy.  I 
agree with switching from metoprolol to Coreg.  The patient will also benefit 
from an ACE or an ARB for his ischemic cardiomyopathy.  The patient will 
continue on low dose diuretics.  The patient will have close followup of his 
laboratory studies including his BUN and creatinine.

 

I will discuss with the patient and his family as to whether further cardiac 
workup would be appropriate, whether he would want a stress test for evaluation 
of progression of his coronary artery disease.

 

 [id number]/CPS #: [id number]

MTDD

## 2018-12-11 LAB
BASOPHILS # BLD AUTO: 0.1 10^3/UL (ref 0–0.2)
EOSINOPHIL # BLD AUTO: 0.2 10^3/UL (ref 0–0.6)
HCT VFR BLD AUTO: 33 % (ref 42–52)
HGB BLD-MCNC: 10.9 G/DL (ref 14–18)
LYMPHOCYTES # BLD AUTO: 2 10^3/UL (ref 1–4.8)
MCH RBC QN AUTO: 30 PG (ref 27–31)
MCHC RBC AUTO-ENTMCNC: 33 G/DL (ref 31–36)
MCV RBC AUTO: 91 FL (ref 80–94)
MONOCYTES # BLD AUTO: 0.7 10^3/UL (ref 0–0.8)
NEUTROPHILS # BLD AUTO: 8.8 10^3/UL (ref 1.5–7.7)
NRBC # BLD AUTO: 0 10^3/UL
NRBC BLD QL AUTO: 0
PLATELET # BLD AUTO: 544 10^3/UL (ref 150–450)
RBC # BLD AUTO: 3.64 10^6/UL (ref 4–5.4)
WBC # BLD AUTO: 11.8 10^3/UL (ref 3.5–10.8)

## 2018-12-11 PROCEDURE — 4A12XM4 MONITORING OF CARDIAC STRESS, EXTERNAL APPROACH: ICD-10-PCS | Performed by: INTERNAL MEDICINE

## 2018-12-11 RX ADMIN — HEPARIN SODIUM SCH UNITS: 5000 INJECTION INTRAVENOUS; SUBCUTANEOUS at 21:37

## 2018-12-11 RX ADMIN — FOLIC ACID SCH: 1 TABLET ORAL at 08:45

## 2018-12-11 RX ADMIN — CARVEDILOL SCH MG: 3.12 TABLET, FILM COATED ORAL at 21:32

## 2018-12-11 RX ADMIN — INSULIN LISPRO SCH UNIT: 100 INJECTION, SOLUTION INTRAVENOUS; SUBCUTANEOUS at 16:45

## 2018-12-11 RX ADMIN — INSULIN LISPRO SCH: 100 INJECTION, SOLUTION INTRAVENOUS; SUBCUTANEOUS at 08:45

## 2018-12-11 RX ADMIN — FUROSEMIDE SCH: 20 TABLET ORAL at 08:45

## 2018-12-11 RX ADMIN — HEPARIN SODIUM SCH UNITS: 5000 INJECTION INTRAVENOUS; SUBCUTANEOUS at 13:50

## 2018-12-11 RX ADMIN — ASPIRIN 325 MG ORAL TABLET SCH: 325 PILL ORAL at 08:45

## 2018-12-11 RX ADMIN — CARVEDILOL SCH: 3.12 TABLET, FILM COATED ORAL at 08:45

## 2018-12-11 RX ADMIN — TAMSULOSIN HYDROCHLORIDE SCH: 0.4 CAPSULE ORAL at 08:46

## 2018-12-11 RX ADMIN — CYANOCOBALAMIN TAB 500 MCG SCH: 500 TAB at 08:45

## 2018-12-11 RX ADMIN — INSULIN LISPRO SCH UNIT: 100 INJECTION, SOLUTION INTRAVENOUS; SUBCUTANEOUS at 12:06

## 2018-12-11 RX ADMIN — INSULIN LISPRO SCH UNIT: 100 INJECTION, SOLUTION INTRAVENOUS; SUBCUTANEOUS at 21:34

## 2018-12-11 RX ADMIN — ATORVASTATIN CALCIUM SCH MG: 10 TABLET, FILM COATED ORAL at 21:32

## 2018-12-11 RX ADMIN — HEPARIN SODIUM SCH UNITS: 5000 INJECTION INTRAVENOUS; SUBCUTANEOUS at 05:34

## 2018-12-11 RX ADMIN — LISINOPRIL SCH: 5 TABLET ORAL at 08:45

## 2018-12-11 NOTE — PN
Subjective


Date of Service: 12/11/18


Interval History: 


Pt seen and examined.  Meds and labs reviewed.  





CC: N/A





ROS:  Denied HA/dizziness, F/C, N/V, CP, SOB, increased cough, sputum production

, abd pain, diarrhea, constipation, dysuria, myalgias, arthralgias, throat pain

, and new skin lesions.  The rest of the 14 point ROS are unremarkable.





PHYSICAL EXAM:


GEN APPEARANCE: Awake, not in acute distress


HEENT: NC/AT, PERRLA, moist oral mucosa, (-) throat erythema


NECK: Soft, supple, (-) cervical LAD, (-)JVD


HEART: S1S2 WNL, RRR, No MRG


CHEST: CTA, BL, GAE, No W/R/R


ABD: Soft, ND/NT, NABS 4x Q


EXT: No C/C/E


SKIN: Warm to touch


PSYCH: No active psychosis, hallucinations, depression, SI/HI


Family History: Unchanged from Admission


Social History: Unchanged from Admission


Past Medical History: Unchanged from Admission





Objective


Active Medications: 








Acetaminophen (Tylenol Tab*)  650 mg PO Q4H PRN


   PRN Reason: FEVER/PAIN


Albuterol (Ventolin 2.5 Mg/3 Ml Neb.Sol*)  2.5 mg INH Q4H PRN


   PRN Reason: SOB/WHEEZING


Aspirin (Aspirin Tab*)  325 mg PO DAILY [facility]


   Last Admin: 12/11/18 08:45 Dose:  Not Given


Atorvastatin Calcium (Lipitor*)  10 mg PO BEDTIME [facility]; Protocol


   Last Admin: 12/10/18 21:15 Dose:  10 mg


Carvedilol (Coreg Tab*)  3.125 mg PO BID [facility]


   Last Admin: 12/11/18 08:45 Dose:  Not Given


Cyanocobalamin (Vitamin B12 Tab*)  1,000 mcg PO DAILY [facility]


   Last Admin: 12/11/18 08:45 Dose:  Not Given


Dextrose (D50w Syringe 50 Ml*)  12.5 gm IV PUSH .FOR FS < 60 - SS PRN


   PRN Reason: FS < 60


Folic Acid (Folvite Tab*)  1 mg PO DAILY [facility]


   Last Admin: 12/11/18 08:45 Dose:  Not Given


Furosemide (Lasix Tab*)  20 mg PO DAILY [facility]


   Last Admin: 12/11/18 08:45 Dose:  Not Given


Guaifenesin (Robitussin*)  5 ml PO Q4H PRN


   PRN Reason: COUGH


Heparin Sodium (Porcine) (Heparin Vial(*))  5,000 units SUBCUT Q8HR [facility]


   Last Admin: 12/11/18 13:50 Dose:  5,000 units


Insulin Human Lispro (Humalog*)  0 units SUBCUT ACHS [facility]; Protocol


   Last Admin: 12/11/18 16:45 Dose:  1 unit


Lisinopril (Prinivil Tab*)  5 mg PO DAILY [facility]


   Last Admin: 12/11/18 08:45 Dose:  Not Given


Ondansetron HCl (Zofran Inj*)  4 mg IV Q6H PRN


   PRN Reason: NAUSEA/VOMITING


Tamsulosin HCl (Flomax Cap*)  0.8 mg PO DAILY [facility]


   Last Admin: 12/11/18 08:46 Dose:  Not Given








 Vital Signs - 8 hr











  12/11/18 12/11/18 12/11/18





  12:10 15:43 19:20


 


Temperature  97.6 F 97.6 F


 


Pulse Rate 71 57 38


 


Respiratory  18 16





Rate   


 


Blood Pressure  115/52 124/63





(mmHg)   


 


O2 Sat by Pulse  100 96





Oximetry   














  12/11/18





  19:55


 


Temperature 


 


Pulse Rate 87


 


Respiratory 





Rate 


 


Blood Pressure 





(mmHg) 


 


O2 Sat by Pulse 





Oximetry 











Oxygen Devices in Use Now: Nasal Cannula


Result Diagrams: 


 12/11/18 05:42





 12/11/18 05:42


Additional Lab and Data: 


 





Microbiology and Other Data: 


 Microbiology











 12/08/18 13:20 Stool Gross Appearance - Final





 Stool 














Assess/Plan/Problems-Billing


Assessment: 





Mr. Langston is a 88 yo male with PMH significant for systolic HF, CKD, CAD, DM2, 

HTN, HLD, BPH, and recent PNA who presented to the emergency room with 

shortness of breath with exertion, CHLOE, elevated troponin and lactic acidosis. 








- Patient Problems


(1) Dyspnea


Current Visit: Yes   Status: Acute   Code(s): R06.00 - DYSPNEA, UNSPECIFIED   

SNOMED Code(s): 112022137


   Comment: 


- Improving


- Likely due to CHF exacerbation given pulmonary edema on CXR


-Continue Lasix


-High-risk stress test---d/w Dr. Mo and given fixed defect, no intervention 

at this time, i.e., PCI   





(2) CHLOE (acute kidney injury)


Current Visit: Yes   Status: Acute   Code(s): N17.9 - ACUTE KIDNEY FAILURE, 

UNSPECIFIED   SNOMED Code(s): 98763026


   Comment: 


- Creatinine improving, unclear baseline as no labs prior to 11/30


- Likely due to CHF exacerbation


   





(3) Elevated troponin


Current Visit: Yes   Status: Acute   Code(s): R74.8 - ABNORMAL LEVELS OF OTHER 

SERUM ENZYMES   SNOMED Code(s): 229067873


   Comment: 


- Denies chest pain


- Troponin has been trending down since admission; 0.28, 0.25, 0.21


- Echo on 11/30 with multiple regional wall motion abnormalities and EF 30%


- Suspect demand ischemia in the setting of recent PNA and CHF


- Further recommendations based on Cardiology consult   





(4) Diarrhea


Current Visit: Yes   Status: Acute   Code(s): R19.7 - DIARRHEA, UNSPECIFIED   

SNOMED Code(s): 47661480


   Comment: 


- Suspect secondary to recent ABX use


- Stool culture pending   





(5) Systolic heart failure


Current Visit: Yes   Status: Acute   Code(s): I50.20 - UNSPECIFIED SYSTOLIC (

CONGESTIVE) HEART FAILURE   SNOMED Code(s): 331431607


   Comment: 


- Echo on 11/30, EF 30%


- Cardiology consult, pending


- Continue low dose Lasix and Coreg


- Daily weights and strict I+O's   





(6) Pulmonary HTN


Current Visit: Yes   Status: Acute   Code(s): I27.20 - PULMONARY HYPERTENSION, 

UNSPECIFIED   SNOMED Code(s): 63626863


   Comment: 


- Severe pulmonary HTN on echo


- Pulmonology consult, pending   





(7) HTN (hypertension)


Current Visit: Yes   Status: Acute   Code(s): I10 - ESSENTIAL (PRIMARY) 

HYPERTENSION   SNOMED Code(s): 91254687


   Comment: 


- -120's


- Continue Coreg 


- Hold Lisinopril in the setting of CHLOE   





(8) Diabetes mellitus


Current Visit: Yes   Status: Chronic   Code(s): E11.9 - TYPE 2 DIABETES 

MELLITUS WITHOUT COMPLICATIONS   SNOMED Code(s): 95764079


   Comment: 


- Glucose 180-310's


- HgA1C was 7.1% on 12/2


- Continue Lispro sliding scale


- Hold glipizide   





(9) HLD (hyperlipidemia)


Current Visit: No   Status: Chronic   Code(s): E78.5 - HYPERLIPIDEMIA, 

UNSPECIFIED   SNOMED Code(s): 32823326


   Comment: 


- Continue atorvastatin   





(10) DVT prophylaxis


Current Visit: Yes   Status: Acute   Code(s): GYD5780 -    SNOMED Code(s): 

376750969


   Comment: 


- HSQ   


Status and Disposition: 


-Possible D/C in AM


-For ambulatory sats in AM---pt mentions hes on 1L NC at home and will 

ambulate at this level

## 2018-12-12 RX ADMIN — CARVEDILOL SCH MG: 3.12 TABLET, FILM COATED ORAL at 07:51

## 2018-12-12 RX ADMIN — CYANOCOBALAMIN TAB 500 MCG SCH MCG: 500 TAB at 07:51

## 2018-12-12 RX ADMIN — HEPARIN SODIUM SCH UNITS: 5000 INJECTION INTRAVENOUS; SUBCUTANEOUS at 04:55

## 2018-12-12 RX ADMIN — INSULIN LISPRO SCH UNIT: 100 INJECTION, SOLUTION INTRAVENOUS; SUBCUTANEOUS at 07:51

## 2018-12-12 RX ADMIN — INSULIN LISPRO SCH UNIT: 100 INJECTION, SOLUTION INTRAVENOUS; SUBCUTANEOUS at 17:11

## 2018-12-12 RX ADMIN — NYSTATIN SCH APPLIC: 100000 POWDER TOPICAL at 21:44

## 2018-12-12 RX ADMIN — INSULIN LISPRO SCH UNIT: 100 INJECTION, SOLUTION INTRAVENOUS; SUBCUTANEOUS at 21:46

## 2018-12-12 RX ADMIN — NYSTATIN SCH APPLIC: 100000 POWDER TOPICAL at 13:08

## 2018-12-12 RX ADMIN — FUROSEMIDE SCH MG: 20 TABLET ORAL at 07:51

## 2018-12-12 RX ADMIN — FOLIC ACID SCH MG: 1 TABLET ORAL at 07:51

## 2018-12-12 RX ADMIN — ASPIRIN 325 MG ORAL TABLET SCH MG: 325 PILL ORAL at 07:51

## 2018-12-12 RX ADMIN — HEPARIN SODIUM SCH UNITS: 5000 INJECTION INTRAVENOUS; SUBCUTANEOUS at 21:46

## 2018-12-12 RX ADMIN — INSULIN LISPRO SCH UNIT: 100 INJECTION, SOLUTION INTRAVENOUS; SUBCUTANEOUS at 11:40

## 2018-12-12 RX ADMIN — TAMSULOSIN HYDROCHLORIDE SCH MG: 0.4 CAPSULE ORAL at 07:51

## 2018-12-12 RX ADMIN — HEPARIN SODIUM SCH UNITS: 5000 INJECTION INTRAVENOUS; SUBCUTANEOUS at 13:08

## 2018-12-12 RX ADMIN — LISINOPRIL SCH MG: 5 TABLET ORAL at 07:51

## 2018-12-12 NOTE — PN
Hospitalist Progress Note


Date of Service: 12/12/18





Called for low bp.  NOte today patient received ace, beta and lasix this am.  

Touched base with cardiology.  Plan to stop coreg and ace, add toprol xl in am 

per Cards.   Also given 250cc bolus.  will follow closely.

## 2018-12-12 NOTE — PN
Subjective


Date of Service: 12/12/18


Interval History: 


Pt seen and examined.  Meds and labs reviewed.  





CC: N/A





ROS:  Denied HA/dizziness, F/C, N/V, CP, SOB, increased cough, sputum production

, abd pain, diarrhea, constipation, dysuria, myalgias, arthralgias, throat pain

, and new skin lesions.  The rest of the 14 point ROS are unremarkable.





PHYSICAL EXAM:


GEN APPEARANCE: Awake, not in acute distress


HEENT: NC/AT, PERRLA, moist oral mucosa, (-) throat erythema


NECK: Soft, supple, (-) cervical LAD, (-)JVD


HEART: S1S2 WNL, RRR, No MRG


CHEST: CTA, BL, GAE, No W/R/R


ABD: Soft, ND/NT, NABS 4x Q


EXT: No C/C/E


SKIN: Warm to touch


PSYCH: No active psychosis, hallucinations, depression, SI/HI


Family History: Unchanged from Admission


Social History: Unchanged from Admission


Past Medical History: Unchanged from Admission





Objective


Active Medications: 








Acetaminophen (Tylenol Tab*)  650 mg PO Q4H PRN


   PRN Reason: FEVER/PAIN


Albuterol (Ventolin 2.5 Mg/3 Ml Neb.Sol*)  2.5 mg INH Q4H PRN


   PRN Reason: SOB/WHEEZING


Aspirin (Aspirin 81 Mg Chew Tab*)  81 mg PO DAILY [facility]


Atorvastatin Calcium (Lipitor*)  40 mg PO 2100 [facility]


Carvedilol (Coreg Tab*)  6.25 mg PO BID [facility]


Cyanocobalamin (Vitamin B12 Tab*)  1,000 mcg PO DAILY [facility]


   Last Admin: 12/12/18 07:51 Dose:  1,000 mcg


Dextrose (D50w Syringe 50 Ml*)  12.5 gm IV PUSH .FOR FS < 60 - SS PRN


   PRN Reason: FS < 60


Folic Acid (Folvite Tab*)  1 mg PO DAILY [facility]


   Last Admin: 12/12/18 07:51 Dose:  1 mg


Furosemide (Lasix Tab*)  20 mg PO DAILY [facility]


   Last Admin: 12/12/18 07:51 Dose:  20 mg


Guaifenesin (Robitussin*)  5 ml PO Q4H PRN


   PRN Reason: COUGH


Heparin Sodium (Porcine) (Heparin Vial(*))  5,000 units SUBCUT Q8HR [facility]


   Last Admin: 12/12/18 13:08 Dose:  5,000 units


Sodium Chloride (Ns 0.9% 250 Ml*)  250 mls @ 0 mls/hr IV ONCE ONE


   Stop: 12/12/18 20:08


Insulin Human Lispro (Humalog*)  0 units SUBCUT ACHS [facility]; Protocol


   Last Admin: 12/12/18 17:11 Dose:  4 unit


Lisinopril (Prinivil Tab*)  5 mg PO DAILY [facility]


   Last Admin: 12/12/18 07:51 Dose:  5 mg


Nystatin (Nystatin Top Powder*)  1 applic TOPICAL TID [facility]


   Last Admin: 12/12/18 13:08 Dose:  1 applic


Ondansetron HCl (Zofran Inj*)  4 mg IV Q6H PRN


   PRN Reason: NAUSEA/VOMITING


Tamsulosin HCl (Flomax Cap*)  0.8 mg PO DAILY [facility]


   Last Admin: 12/12/18 07:51 Dose:  0.8 mg


Tramadol HCl (Ultram*)  50 mg PO Q6H PRN


   PRN Reason: Breakthrough pain


   Last Admin: 12/12/18 17:12 Dose:  50 mg








 Vital Signs - 8 hr











  12/12/18 12/12/18 12/12/18





  12:21 15:06 15:16


 


Temperature  98.1 F 


 


Pulse Rate  39 


 


Respiratory 18 26 16





Rate   


 


Blood Pressure  101/46 





(mmHg)   


 


O2 Sat by Pulse  97 





Oximetry   














  12/12/18 12/12/18 12/12/18





  15:20 17:12 18:43


 


Temperature   98.5 F


 


Pulse Rate 86  84


 


Respiratory  16 18





Rate   


 


Blood Pressure   64/26





(mmHg)   


 


O2 Sat by Pulse   93





Oximetry   











Oxygen Devices in Use Now: None, Nasal Cannula


Result Diagrams: 


 12/11/18 05:42





 12/11/18 05:42


Additional Lab and Data: 


 





Microbiology and Other Data: 


 Microbiology











 12/08/18 13:20 Stool Gross Appearance - Final





 Stool 














Assess/Plan/Problems-Billing


Assessment: 





Mr. Langston is a 88 yo male with PMH significant for systolic HF, CKD, CAD, DM2, 

HTN, HLD, BPH, and recent PNA who presented to the emergency room with 

shortness of breath with exertion, CHLOE, elevated troponin and lactic acidosis. 








- Patient Problems


(1) Dyspnea


Current Visit: Yes   Status: Acute   Code(s): R06.00 - DYSPNEA, UNSPECIFIED   

SNOMED Code(s): 241356764


   Comment: 


- Continues to improve


- Appreciate Cardiology f/U


- Likely due to CHF exacerbation given pulmonary edema on CXR


-Continue Lasix


-High-risk stress test---d/w Dr. Mo and given fixed defect, no intervention 

at this time, i.e., PCI      





(2) CHLOE (acute kidney injury)


Current Visit: Yes   Status: Acute   Code(s): N17.9 - ACUTE KIDNEY FAILURE, 

UNSPECIFIED   SNOMED Code(s): 62681932


   Comment: 


- Creatinine improving, unclear baseline as no labs prior to 11/30


- Likely due to CHF exacerbation


   





(3) Elevated troponin


Current Visit: Yes   Status: Acute   Code(s): R74.8 - ABNORMAL LEVELS OF OTHER 

SERUM ENZYMES   SNOMED Code(s): 345044126


   Comment: 


- Denies chest pain


- Troponin has been trending down since admission; 0.28, 0.25, 0.21


- Echo on 11/30 with multiple regional wall motion abnormalities and EF 30%


- Suspect demand ischemia in the setting of recent PNA and CHF


- Further recommendations based on Cardiology consult   





(4) Diarrhea


Current Visit: Yes   Status: Acute   Code(s): R19.7 - DIARRHEA, UNSPECIFIED   

SNOMED Code(s): 58308616


   Comment: 


- Suspect secondary to recent ABX use


- Stool culture pending   





(5) Systolic heart failure


Current Visit: Yes   Status: Acute   Code(s): I50.20 - UNSPECIFIED SYSTOLIC (

CONGESTIVE) HEART FAILURE   SNOMED Code(s): 054468032


   Comment: 


- Echo on 11/30, EF 30%--given this pt is candidate for ICD and being set-up by 

cards for Life vest prior to D/C


- Cardiology consult, pending


- Continue low dose Lasix and Coreg


- Daily weights and strict I+O's      





(6) Pulmonary HTN


Current Visit: Yes   Status: Acute   Code(s): I27.20 - PULMONARY HYPERTENSION, 

UNSPECIFIED   SNOMED Code(s): 29023824


   Comment: 


- Severe pulmonary HTN on echo


- Pulmonology consult, pending   





(7) HTN (hypertension)


Current Visit: Yes   Status: Acute   Code(s): I10 - ESSENTIAL (PRIMARY) 

HYPERTENSION   SNOMED Code(s): 57670889


   Comment: 


- -120's


- Continue Coreg 


- Hold Lisinopril in the setting of CHLOE   





(8) Diabetes mellitus


Current Visit: Yes   Status: Chronic   Code(s): E11.9 - TYPE 2 DIABETES 

MELLITUS WITHOUT COMPLICATIONS   SNOMED Code(s): 46301785


   Comment: 


- Glucose 180-310's


- HgA1C was 7.1% on 12/2


- Continue Lispro sliding scale


- Hold glipizide   





(9) HLD (hyperlipidemia)


Current Visit: No   Status: Chronic   Code(s): E78.5 - HYPERLIPIDEMIA, 

UNSPECIFIED   SNOMED Code(s): 26449820


   Comment: 


- Continue atorvastatin   





(10) DVT prophylaxis


Current Visit: Yes   Status: Acute   Code(s): PTA3677 -    SNOMED Code(s): 

647603494


   Comment: 


- HSQ   


Status and Disposition: 


-Possible D/C in AM


-For ambulatory sats in AM---pt mentions hes on 1L NC at home and will 

ambulate at this level


-AS not yet candidate for AVR

## 2018-12-12 NOTE — PN
<Larissa Delacruz - Last Filed: 12/12/18 10:42>





Subjective


Date of Service: 12/12/18 - severe LV dysfuntion, ICM, CAD


Interval History: 





I had the pleasure seeing Mr. Langston today who was accompanied by his son who 

was in the room. The patient states he has been doing well and has been 

ambulating the halls with no difficulty. He reports increased urination and 

doesn't appreciate improved breathing ability however, his son who lives next 

door to the patient states that over the weekend the patient was barely able to 

ambulate 50 feet before having to stop due to SOB. The patient today physically 

ambulated 100 feet with no difficulty with my help and his son. He denies chest 

pain, palpitations, sensation of heart racing, edema, dizziness or syncope.





Medications


Active Medications: 








Acetaminophen (Tylenol Tab*)  650 mg PO Q4H PRN


   PRN Reason: FEVER/PAIN


Albuterol (Ventolin 2.5 Mg/3 Ml Neb.Sol*)  2.5 mg INH Q4H PRN


   PRN Reason: SOB/WHEEZING


Aspirin (Aspirin Tab*)  325 mg PO DAILY [facility]


   Last Admin: 12/12/18 07:51 Dose:  325 mg


Atorvastatin Calcium (Lipitor*)  10 mg PO BEDTIME [facility]; Protocol


   Last Admin: 12/11/18 21:32 Dose:  10 mg


Carvedilol (Coreg Tab*)  6.25 mg PO BID [facility]


Cyanocobalamin (Vitamin B12 Tab*)  1,000 mcg PO DAILY [facility]


   Last Admin: 12/12/18 07:51 Dose:  1,000 mcg


Dextrose (D50w Syringe 50 Ml*)  12.5 gm IV PUSH .FOR FS < 60 - SS PRN


   PRN Reason: FS < 60


Folic Acid (Folvite Tab*)  1 mg PO DAILY [facility]


   Last Admin: 12/12/18 07:51 Dose:  1 mg


Furosemide (Lasix Tab*)  20 mg PO DAILY [facility]


   Last Admin: 12/12/18 07:51 Dose:  20 mg


Guaifenesin (Robitussin*)  5 ml PO Q4H PRN


   PRN Reason: COUGH


Heparin Sodium (Porcine) (Heparin Vial(*))  5,000 units SUBCUT Q8HR [facility]


   Last Admin: 12/12/18 04:55 Dose:  5,000 units


Insulin Human Lispro (Humalog*)  0 units SUBCUT ACHS [facility]; Protocol


   Last Admin: 12/12/18 07:51 Dose:  2 unit


Lisinopril (Prinivil Tab*)  5 mg PO DAILY [facility]


   Last Admin: 12/12/18 07:51 Dose:  5 mg


Ondansetron HCl (Zofran Inj*)  4 mg IV Q6H PRN


   PRN Reason: NAUSEA/VOMITING


Tamsulosin HCl (Flomax Cap*)  0.8 mg PO DAILY [facility]


   Last Admin: 12/12/18 07:51 Dose:  0.8 mg








Objective


Vital Signs:











Temp Pulse Resp BP Pulse Ox


 


 97.7 F   84   20   118/48   95 


 


 12/12/18 07:35  12/12/18 07:49  12/12/18 07:35  12/12/18 07:35  12/12/18 07:35











Oxygen Devices in Use Now: None, Nasal Cannula


Eyes: No Scleral Icterus, PERRLA


Ears/Nose/Mouth/Throat: NL Teeth, Lips, Gums, Clear Oropharnyx, Mucous 

Membranes Moist


Neck: NL Appearance and Movements; NL JVP, Trachea Midline, No Thyroid 

Enlargement, Masses


Respiratory: Clear to Auscultation - however breath sound are diminished 

throughout., -


Cardiovascular: NL Sounds; No Murmurs; No JVD, - - Normal S1, S2 RRR, Grade 3/6 

aortic valve murmur, Grade 3/5 diastolic mitral murmur, no rub or gallop


Abdominal: NL Sounds; No Tenderness; No Distention


Lymphatic: No Cervical Adenopathy


Skin: No Rash or Ulcers


Neurological: Alert and Oriented x 3


Lines/Tubes/Other Access: Clean, Dry and Intact Peripheral IV


Laboratory Results: 


 





 12/11/18 05:42 





 12/11/18 05:42 





 











Total Bilirubin  0.40 mg/dL (0.2-1.0)   12/08/18  09:39    


 


AST  32 U/L (13-39)   12/08/18  09:39    


 


ALT  104 U/L (7-52)  H  12/08/18  09:39    


 


Alkaline Phosphatase  148 U/L ()  H  12/08/18  09:39    


 


B-Natriuretic Peptide  303 pg/mL (<=100)  H  12/08/18  09:39    


 


Total Protein  7.0 g/dL (6.4-8.9)   12/08/18  09:39    


 


Albumin  3.1 g/dL (3.2-5.2)  L  12/08/18  09:39    


 


Globulin  3.9 g/dL (2-4)   12/08/18  09:39    


 


Albumin/Globulin Ratio  0.8  (1-3)  L  12/08/18  09:39    








 











  12/08/18 12/08/18 12/08/18





  09:39 13:07 16:17


 


Troponin I  0.28 H*  0.25 H*  0.21 H*








 Laboratory Results - last 24 hr











  12/11/18 12/11/18 12/11/18





  11:20 16:16 21:09


 


POC Glucose (mg/dL)  220 H  140 H  196 H














  12/12/18





  07:18


 


POC Glucose (mg/dL)  164 H











Diagnostic Imaging: 





Lexiscan Stress Test 12/11/2018; LVEF 26% with fixed defect involving inferior 

lateral ventricular wall extending to the apex of the heart. ecg portion was 

non diagnostic.





Telemetry; Sinus rhythm with frequent PACs.


EKG Data: 





none to review for today.





Assessment/Plan





#1 Newly diagnosed severe LV dysfunction; Likely ICM LVEF in 2002 before 

coronary PCI was 40%. NYHA class 2-3 stage C. Appears compensated today on 

Lasix 20mg PO daily. HR is 70-80's thus will increase coreg to 6.25mg PO BID. 

Continue Lisinopril 5mg/day. Would caution increasing ACEI given concern for 

possible low flow AS gradient. breathing ability has improved. He is interested 

in lifevest thus would recommend Lifevest. will update BMP


#2 Mild to moderate AS; mean gradient 10, DI not reported and LVEF 30%. unable 

to r/o low flow gradient. he appears stable and is compensated.


#3 troponinemia; Troponin peaked 12/8/018 at .28, he denies chest pain. He 

previously underwent PCI in 2002 at [facility]. Stress test revealed fixed 

inferolateral defect extending to apex. Will treat medically. He is on ASA 325mg

/day will reduce to 81/day. continue Coreg therapy. Increase Lipitor to 40mg PO 

QHS. He is not experiencing angina.


#4 Moderate to severe pulmonary HTN; RVSP 63, on lasix. defer to Dr. Espana who 

ordered overnight oximetry.


#5 h/o CKD; Will update labs. would like to place patient on Aldactone in the 

future if he could tolerate it. 


#6 Disposition; pending course.


Attending: Carolina Bass





<Carolina Bass - Last Filed: 12/12/18 18:50>





Medications


Active Medications: 








Acetaminophen (Tylenol Tab*)  650 mg PO Q4H PRN


   PRN Reason: FEVER/PAIN


Albuterol (Ventolin 2.5 Mg/3 Ml Neb.Sol*)  2.5 mg INH Q4H PRN


   PRN Reason: SOB/WHEEZING


Aspirin (Aspirin 81 Mg Chew Tab*)  81 mg PO DAILY [facility]


Atorvastatin Calcium (Lipitor*)  40 mg PO 2100 [facility]


Carvedilol (Coreg Tab*)  6.25 mg PO BID [facility]


Cyanocobalamin (Vitamin B12 Tab*)  1,000 mcg PO DAILY [facility]


   Last Admin: 12/12/18 07:51 Dose:  1,000 mcg


Dextrose (D50w Syringe 50 Ml*)  12.5 gm IV PUSH .FOR FS < 60 - SS PRN


   PRN Reason: FS < 60


Folic Acid (Folvite Tab*)  1 mg PO DAILY [facility]


   Last Admin: 12/12/18 07:51 Dose:  1 mg


Furosemide (Lasix Tab*)  20 mg PO DAILY [facility]


   Last Admin: 12/12/18 07:51 Dose:  20 mg


Guaifenesin (Robitussin*)  5 ml PO Q4H PRN


   PRN Reason: COUGH


Heparin Sodium (Porcine) (Heparin Vial(*))  5,000 units SUBCUT Q8HR [facility]


   Last Admin: 12/12/18 13:08 Dose:  5,000 units


Insulin Human Lispro (Humalog*)  0 units SUBCUT ACHS [facility]; Protocol


   Last Admin: 12/12/18 17:11 Dose:  4 unit


Lisinopril (Prinivil Tab*)  5 mg PO DAILY [facility]


   Last Admin: 12/12/18 07:51 Dose:  5 mg


Nystatin (Nystatin Top Powder*)  1 applic TOPICAL TID [facility]


   Last Admin: 12/12/18 13:08 Dose:  1 applic


Ondansetron HCl (Zofran Inj*)  4 mg IV Q6H PRN


   PRN Reason: NAUSEA/VOMITING


Tamsulosin HCl (Flomax Cap*)  0.8 mg PO DAILY [facility]


   Last Admin: 12/12/18 07:51 Dose:  0.8 mg


Tramadol HCl (Ultram*)  50 mg PO Q6H PRN


   PRN Reason: Breakthrough pain


   Last Admin: 12/12/18 17:12 Dose:  50 mg








Objective


Vital Signs:











Temp Pulse Resp BP Pulse Ox


 


 98.1 F   86   16   101/46   97 


 


 12/12/18 15:06  12/12/18 15:20  12/12/18 17:12  12/12/18 15:06  12/12/18 15:06











Laboratory Results: 


 





 12/11/18 05:42 





 12/11/18 05:42 





 











Total Bilirubin  0.40 mg/dL (0.2-1.0)   12/08/18  09:39    


 


AST  32 U/L (13-39)   12/08/18  09:39    


 


ALT  104 U/L (7-52)  H  12/08/18  09:39    


 


Alkaline Phosphatase  148 U/L ()  H  12/08/18  09:39    


 


B-Natriuretic Peptide  303 pg/mL (<=100)  H  12/08/18  09:39    


 


Total Protein  7.0 g/dL (6.4-8.9)   12/08/18  09:39    


 


Albumin  3.1 g/dL (3.2-5.2)  L  12/08/18  09:39    


 


Globulin  3.9 g/dL (2-4)   12/08/18  09:39    


 


Albumin/Globulin Ratio  0.8  (1-3)  L  12/08/18  09:39    








 











  12/08/18 12/08/18 12/08/18





  09:39 13:07 16:17


 


Troponin I  0.28 H*  0.25 H*  0.21 H*














Assessment/Plan





I personally saw and examined the patient in the presence of his son and 

daughter in law.


The patient states he has been walking, has not had to stop in the halls here, 

but feels he is not at his baseline, that he has not improved.


He also c/o a raw behind, he wants a shower and feels this limits his ability 

to ambulate.





The patient has fine basilar crackles, soft but late peaking SM heard across 

the precordium.


No HSM, no LE edema.





Nuclear stress 12/10/18 shows EF 26%, fixed inferior lateral apical defect c/w 

old MI.


Echo 11/30/18 showed EF 30%, mild to moderate AS, PA pressure 63 mmHg, I 

reviewed personally, on 2D imaging RCC and LCC open, c/w mild/moderate AS not 

severe.





Overnight oximetry: O2 in the 80's.





The patient was admitted in late November with SOB, septic and with low EF/high 

PA pressures, now returns with CHF and evidence of large MI and no 

interventional options.  AS and CKD facotrs.





I agree with medical management as above, diet education wrt salt will be 

important.


It will be difficult to achieve compensation and I expect it will take some 

time.  If improves could trial entresto (out patient).


Not a candidate for AVR now.


ICD candidate and reviewed Zoll with pt family in follow up to Larissa Aguilar 

above.








OK to shower off monitor.


Will order dietary consult.

## 2018-12-13 VITALS — DIASTOLIC BLOOD PRESSURE: 45 MMHG | SYSTOLIC BLOOD PRESSURE: 100 MMHG

## 2018-12-13 RX ADMIN — FOLIC ACID SCH MG: 1 TABLET ORAL at 08:30

## 2018-12-13 RX ADMIN — NYSTATIN SCH APPLIC: 100000 POWDER TOPICAL at 13:06

## 2018-12-13 RX ADMIN — FUROSEMIDE SCH MG: 20 TABLET ORAL at 08:30

## 2018-12-13 RX ADMIN — NYSTATIN SCH APPLIC: 100000 POWDER TOPICAL at 08:30

## 2018-12-13 RX ADMIN — INSULIN LISPRO SCH UNIT: 100 INJECTION, SOLUTION INTRAVENOUS; SUBCUTANEOUS at 12:04

## 2018-12-13 RX ADMIN — HEPARIN SODIUM SCH UNITS: 5000 INJECTION INTRAVENOUS; SUBCUTANEOUS at 13:06

## 2018-12-13 RX ADMIN — INSULIN LISPRO SCH UNIT: 100 INJECTION, SOLUTION INTRAVENOUS; SUBCUTANEOUS at 08:30

## 2018-12-13 RX ADMIN — CYANOCOBALAMIN TAB 500 MCG SCH MCG: 500 TAB at 08:30

## 2018-12-13 RX ADMIN — HEPARIN SODIUM SCH UNITS: 5000 INJECTION INTRAVENOUS; SUBCUTANEOUS at 05:37

## 2018-12-13 RX ADMIN — TAMSULOSIN HYDROCHLORIDE SCH MG: 0.4 CAPSULE ORAL at 08:30

## 2018-12-13 NOTE — PN
Subjective


Date of Service: 12/13/18 - ICM, CKD


Interval History: 





Spoke with RN Jadyn who states patient desaturated last night and o2 was 

applied. Dr. Espana had ordered overnight oximetry. Mr. Langston reports his his 

buttox is raw and hurting as is his testicles. He adds that since 2002 he has 

had chronic testicular swelling which is [facility] 

however, the skin is more irritated sonali usual. He reports good urinary output, 

denies chest pain, sob, chatterjee, palpitations, sensation of heart racing. BP has 

been low but does not appear to be symptomatic. 





Medications


Active Medications: 








Acetaminophen (Tylenol Tab*)  650 mg PO Q4H PRN


   PRN Reason: FEVER/PAIN


Albuterol (Ventolin 2.5 Mg/3 Ml Neb.Sol*)  2.5 mg INH Q4H PRN


   PRN Reason: SOB/WHEEZING


Aspirin (Aspirin 81 Mg Chew Tab*)  81 mg PO DAILY [facility]


   Last Admin: 12/13/18 08:30 Dose:  81 mg


Atorvastatin Calcium (Lipitor*)  40 mg PO 2100 [facility]


   Last Admin: 12/12/18 21:44 Dose:  40 mg


Cyanocobalamin (Vitamin B12 Tab*)  1,000 mcg PO DAILY [facility]


   Last Admin: 12/13/18 08:30 Dose:  1,000 mcg


Dextrose (D50w Syringe 50 Ml*)  12.5 gm IV PUSH .FOR FS < 60 - SS PRN


   PRN Reason: FS < 60


Folic Acid (Folvite Tab*)  1 mg PO DAILY [facility]


   Last Admin: 12/13/18 08:30 Dose:  1 mg


Furosemide (Lasix Tab*)  20 mg PO DAILY [facility]


   Last Admin: 12/13/18 08:30 Dose:  20 mg


Guaifenesin (Robitussin*)  5 ml PO Q4H PRN


   PRN Reason: COUGH


Heparin Sodium (Porcine) (Heparin Vial(*))  5,000 units SUBCUT Q8HR [facility]


   Last Admin: 12/13/18 05:37 Dose:  5,000 units


Insulin Human Lispro (Humalog*)  0 units SUBCUT ACHS [facility]; Protocol


   Last Admin: 12/13/18 08:30 Dose:  2 unit


Metoprolol Succinate (Toprol Xl Tab*)  25 mg PO DAILY [facility]


   Last Admin: 12/13/18 08:30 Dose:  25 mg


Nystatin (Nystatin Top Powder*)  1 applic TOPICAL TID [facility]


   Last Admin: 12/13/18 08:30 Dose:  1 applic


Ondansetron HCl (Zofran Inj*)  4 mg IV Q6H PRN


   PRN Reason: NAUSEA/VOMITING


Tamsulosin HCl (Flomax Cap*)  0.8 mg PO DAILY [facility]


   Last Admin: 12/13/18 08:30 Dose:  0.8 mg


Tramadol HCl (Ultram*)  50 mg PO Q6H PRN


   PRN Reason: Breakthrough pain


   Last Admin: 12/12/18 17:12 Dose:  50 mg








Objective


Vital Signs:











Temp Pulse Resp BP Pulse Ox


 


 97.8 F   48   18   102/46   98 


 


 12/13/18 07:32  12/13/18 07:32  12/13/18 07:32  12/13/18 08:22  12/13/18 07:32











Oxygen Devices in Use Now: None, Nasal Cannula


Appearance: NAD, A+O x3 cooperative with exam


Eyes: No Scleral Icterus, PERRLA


Ears/Nose/Mouth/Throat: NL Teeth, Lips, Gums, Clear Oropharnyx, Mucous 

Membranes Moist


Neck: NL Appearance and Movements; NL JVP, Trachea Midline, No Thyroid 

Enlargement, Masses


Respiratory: Clear to Auscultation - however breath sound are diminished 

throughout., -


Cardiovascular: NL Sounds; No Murmurs; No JVD, - - Normal S1, S2 RRR, Grade 3/6 

aortic valve murmur, Grade 3/5 diastolic mitral murmur, no rub or gallop


Abdominal: NL Sounds; No Tenderness; No Distention


Lymphatic: No Cervical Adenopathy


Skin: No Rash or Ulcers


Neurological: Alert and Oriented x 3


Lines/Tubes/Other Access: Clean, Dry and Intact Peripheral IV


Laboratory Results: 


 





 12/11/18 05:42 





 12/11/18 05:42 





 











Total Bilirubin  0.40 mg/dL (0.2-1.0)   12/08/18  09:39    


 


AST  32 U/L (13-39)   12/08/18  09:39    


 


ALT  104 U/L (7-52)  H  12/08/18  09:39    


 


Alkaline Phosphatase  148 U/L ()  H  12/08/18  09:39    


 


B-Natriuretic Peptide  303 pg/mL (<=100)  H  12/08/18  09:39    


 


Total Protein  7.0 g/dL (6.4-8.9)   12/08/18  09:39    


 


Albumin  3.1 g/dL (3.2-5.2)  L  12/08/18  09:39    


 


Globulin  3.9 g/dL (2-4)   12/08/18  09:39    


 


Albumin/Globulin Ratio  0.8  (1-3)  L  12/08/18  09:39    








 











  12/08/18 12/08/18 12/08/18





  09:39 13:07 16:17


 


Troponin I  0.28 H*  0.25 H*  0.21 H*








 Laboratory Results - last 24 hr











  12/12/18 12/12/18 12/12/18





  11:12 15:08 16:28


 


POC Glucose (mg/dL)  294 H  222 H  224 H














  12/12/18 12/13/18





  20:45 07:20


 


POC Glucose (mg/dL)  240 H  170 H











Diagnostic Imaging: 





Lexiscan Stress Test 12/11/2018; LVEF 26% with fixed defect involving inferior 

lateral ventricular wall extending to the apex of the heart. ecg portion was 

non diagnostic.





Telemetry; Sinus arrythmia with frequent PACs( HR 90's).


EKG Data: 





none to review for today.





Assessment/Plan


#1 SHF LVEF 30% likely ICM. NYHA class 2-3 stage C. Appears compensated today. 

On Toprol XL 25mg/day. SBP is  he is not symptomatic. Life vest is to be 

applied at 1pm today. Will update BMP given h/o CKD and diuretic therapy. given 

BP unable to uptitrate Bblocker or add ACEI.


#2 CAD; h/o multivessle PCI in 2002, ST this admit revealed fixed anterior 

defect. On ASA 81/day, Bblocker and statin therapy. Will need repeat FLP and 

LFT in 6 weeks given adjustment to Lipitor therapy. He continues to deny chest 

pain.


#3 h/o CKD; will update BMP and follow


#4 Mild to moderate AS; DI .50 he is stable and asymptomatic will follow 

outpatient.


#5 Hypoxia on overnight oximetry; Dr. Espana following.


#6 c/o skin breakdown; will consult wound care


#7 Disposition pending course. Will await BMP. Lifevest to be applied at 1pm 

today. He will need FLP/LFT in 6 weeks, repeat BMP in 7-10 days given CKD. F/u 

with Dr. Mo in 2-3 weeks for optimization of CHF medications.





Attending: Amado Amaro

## 2018-12-13 NOTE — CONS
FOLLOWUP CARDIOLOGY NOTE:

 

DATE OF CONSULT:  12/13/18

 

PATIENT OF:  Dr. Espino and Dr. Morris.

 

The patient was seen with Larissa Delacruz today and plan was reviewed and labs 
reviewed.  See her note of 12.13.18, (I was unable to cosign it due to being 
closed electronically).  The patient has severe LV dysfunction, congestive 
heart failure and the plan is for continued medical therapy, titration of his 
medical regimen as tolerated as an outpatient.  In addition to the LV 
dysfunction, he has renal insufficiency and low normal blood pressures which 
limit our medical options.  His followup BMP today revealed a mild increase in 
his creatinine to 2, his potassium was 4.7.

 

Plan is to continue medical management.

 

He is to follow up with Dr. Mo and Larissa as an outpatient for further 
titration of his medications.

 

He has a LifeVest in place at this point. 



 His nuclear revealed a fixed inferolateral defect with severe LV dysfunction, 
EF of 26%; it was decided to continue medical management.

 

 [id number]/CPS #: [id number]

[facility]IL

## 2018-12-13 NOTE — DS
CC:  Dr. Magy Noel; Dr. Mariela Peña; Dr. Neena Espana; Dr. Rafiq Mo; Dr. Humble Lind *

 

DISCHARGE SUMMARY:

 

DATE OF ADMISSION:

 

DATE OF DISCHARGE:  12/13/18

 

DISCHARGE DIAGNOSES:  As follows:

1.  Congestive heart failure exacerbation, systolic, improved.

2.  Acute on chronic renal insufficiency, Lasix and lisinopril withheld.  Lasix 
being withheld for today and tomorrow.  We will defer lisinopril re-
prescription with either cardiologist and/or PCP.

3.  Mildly elevated troponins, likely secondary to demand ischemia due to above.

4.  Severe pulmonary hypertension, history of.

5.  Hypertension.

 

DISCHARGE MEDICATIONS:  As follows:

1.  Aspirin 81 mg p.o. daily.

2.  Cyanocobalamin 1000 mcg p.o. daily.

3.  Folic acid 1 mg p.o. daily.

4.  Guaifenesin 5 mL p.o. q.4 p.r.n.

5.  Metoprolol succinate 25 mg p.o. daily.

6.  Tamsulosin 0.8 mg p.o. daily.

7.  Glipizide 5 mg p.o. q.a.m.

8.  Guaifenesin 400 mg p.o. q.i.d.

9.  Nitroglycerin 0.4 mg sublingual q.5 minutes p.r.n.

10.  Nystatin topical powder 1 application topically t.i.d. for at least 14 days
, especially in the groin area.

11.  Pravastatin 40 mg p.o. q.h.s.

12.  Tramadol 50 mg p.o. q.6 p.r.n.

 

HISTORY OF PRESENT ILLNESS/HOSPITAL COURSE:  The patient is an 87-year-old 
 gentleman, who presented to the ER on 12/08/18 due to shortness of 
breath and weakness along with fatigue.  He was subsequently diagnosed with 
systolic CHF exacerbation and was found to have acute on chronic renal 
insufficiency likely due to above.  He also has had some mildly elevated 
troponins and hence the patient underwent a stress test, which unfortunately 
suggests a high risk stress test; however, upon review by Dr. Mo of these 
images, he felt that the patient has a fixed defect and hence currently does 
not require PCI.  Given his EF is 30%, he is currently under the process of 
having an ICD placed as an outpatient and in the meantime the patient has been 
set up for a LifeVest prior to his discharge.  During his hospitalization course
, he also complained of some erythematous rashes in his groin and is being 
followed by Wound Care.  He has been prescribed nystatin t.i.d. topically and 
to keep the area dry.

 

The patient had been advised to follow up and/or call his PCP within 3 days 
post discharge.  He was advised that given his acute on chronic renal 
insufficiency, his diuretics and ACE inhibitors have been held.  It was 
clarified to the patient that both of them will need to be held for today and 
tomorrow, then the patient can restart Lasix; however, he is to discuss with 
Dr. Mo and/or his PCP when he can restart his lisinopril.  He was advised to 
make sure to adhere to a low-salt diet, no more than 1.5 g per day of sodium 
and to read all food labels and research average sodium contents of food that 
do not have specific labels to estimate the amount that he is taking.  He was 
advised to follow up with Dr. Mo within 7 to 10 days of this discharge and 
he was advised to call my office to make and/or confirm an appointment.  He was 
advised that if his symptoms resume or develop new ones or feel unwell for any 
reason, to call his PCP first.  If his PCP cannot entertain him due to 
scheduling issues alone, to call [facility] instead if the issue is 
not emergent.  He was advised to call my office regarding any questions, 
concerns, or further clarifications regarding his discharge plans and/or 
prescriptions and to take his medications as prescribed.

 

REVIEW OF SYSTEMS:  The patient denied any current headaches, dizziness, fevers
, chills, nausea, vomiting, chest pain, shortness of breath, increased coughing 
or sputum production, abdominal pain, diarrhea, constipation, pain and/or 
increased frequency on urination, myalgias, arthralgias, throat pain, or new 
skin lesions. The rest of the 14-point review of systems is otherwise 
unremarkable.

 

PHYSICAL EXAMINATION:  Reveals the most recent vital signs of record:  Blood 
pressure of 100/45 from 102/46 and 100/34, 97.4 degrees Fahrenheit, 84 beats 
per minute heart rate, 16 per minute respiratory rate, saturating on room air.  
General Appearance:  The patient is awake, alert, and oriented x3, not in acute 
distress. Chest:  Clear to auscultation bilaterally.  Good air entry.  No 
wheezes, rales, or rhonchi.  Abdomen is soft, nondistended, nontender.  
Normoactive bowel sounds x4 quadrants.  Extremities:  No cyanosis, clubbing, or 
edema.  Psychiatric:  No active psychosis, depression, suicidal or homicidal 
ideation.  Skin is warm to touch.

 

TIME SPENT:  The total time spent evaluating the patient, reviewing pertinent 
data, and appropriate documentation is 50 minutes.

 

 [id number]/CPS #: [id number]

[facility]

## 2021-04-16 NOTE — HP
From: Marah Correa  To: Marely Smith NP  Sent: 4/15/2021 3:59 PM CDT  Subject: Other    Hi Deborah Blanks,    I need your help to fill out the attached medical form needed for my workplace. 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  Please let m HISTORY AND PHYSICAL:

 

DATE OF ADMISSION:  12/08/18

 

PROVIDER:  Ryann Gruber NP

 

ATTENDING PHYSICIAN:  Dr. Magy Noel * (dictated by Ryann Gruber NP).

 

PRIMARY CARE PROVIDER:  Dr. Lind of [facility].

 

CHIEF COMPLAINT:  Shortness of breath and fatigue.

 

HISTORY OF PRESENT ILLNESS:  Mr. Langston is an 87-year-old male, who presented 
to the ER today on 12/08/18 with concern for shortness of breath and weakness 
and fatigue.  Mr. Langston was recently admitted at [facility] from 11/
30/18 through 12/05/18, where he was diagnosed with community-acquired 
pneumonia with concurrent sepsis, acute systolic congestive heart failure, 
acute renal failure with suspected chronic kidney disease, elevated troponin 
due to demand ischemia, and acute hypoxemic respiratory failure that resolved 
with no need for supplemental oxygen.  Mr. Langston states that he went home and 
since then has felt tired.  He states that he became very short of breath with 
exertion, it is not new when compared with when he was hospitalized.  His 
family reports that he has been sitting in his chair a lot and sleeping more.  
The patient reports having increased urination secondary to his furosemide, as 
well as 4 to 5 episodes of diarrhea a day, which started when he was on 
antibiotics here in the hospital.  His sons, who are here with him, state that 
they brought him some Gatorade to help with his p.o. intake.  He was purchased 
3 quarts of Gatorade, it took him approximately 3 days to finish 1 quart.  The 
patient does endorse less fluid intake and decreased appetite. He did complete 
his Augmentin, which finished yesterday.  He denies any fever, chills, 
dizziness or falls.  He denies any chest pain or edema.  He does state that his 
weight yesterday was 166 and today is 161.  He denies shortness of breath at 
rest, but does endorse shortness of breath with exertion.  He does report some 
coughing.  He denies abdominal pain, nausea or vomiting, but does endorse 
diarrhea as per above.  He reports increased urination, but denies dysuria, 
denies focal weakness, sensory loss, visual or hearing complaints, swallowing 
complaints, or new joint pains or muscle pains.  He denies any rashes.

 

Here in the ER, the patient received a liter of fluid.  Labs were ordered with 
concern for WBCs of 13,000, platelet counts of 554,000, elevated BUN of 58, 
creatinine of 2.21, lactic acid of 2.8 and troponin of 0.28 with a CRP of 
52.19. He did have a repeat chest x-ray, which I did review that was read as 
consistent with pulmonary edema and likely left hemithorax and pleural effusion 
that is fairly smaller when compared to 12/02/18 chest x-ray.  Given these 
findings, Hospital Medicine was consulted for admission.

 

PAST MEDICAL HISTORY:  Includes:

1.  Recent community-acquired pneumonia, status post antibiotic treatment.

2.  Systolic congestive heart failure with recent acute-on-chronic exacerbation.

3.  Recent acute-on-chronic renal disease.

4.  Recent acute hypoxemic respiratory failure, resolved.

5.  Coronary artery disease, status post stent placement.

6.  Type 2 diabetes mellitus.

7.  Hypertension.

8.  Hyperlipidemia.

5.  Benign prostatic hyperplasia.

 

HOME MEDICATIONS:

1.  Guaifenesin 400 mg 4 times a day p.r.n.

2.  Glipizide 5 mg p.o. q.a.m.

3.  Tamsulosin 0.8 mg daily.

4.  Pravastatin 40 mg at bedtime.

5.  Nitroglycerin 0.4 mg sublingual q.5 minutes p.r.n. x3 doses.

6.  Metoprolol tartrate 25 mg q.12 hours.

7.  Lisinopril 5 mg daily.

8.  Furosemide 40 mg daily.

9.  Folic acid 1 mg p.o. daily.

10.  Vitamin B12 1000 mcg daily.

11.  Aspirin 325 to 650 mg q.4 hours p.r.n.

12.  Augmentin 500 mg b.i.d., completed on 12/07/18.

 

ALLERGIES:  No known drug allergies.

 

FAMILY HISTORY:  Two brothers with cancer.

 

SOCIAL HISTORY:  The patient is a former smoker quitting approximately 18 years 
ago. He endorses occasional alcohol use.  He denies any illicit drug use.  He 
is retired.  He lives alone.  His family lives within close proximity.  He is 
. His surrogate decision maker is his son, Zay, who can be reached 
at [phone].

 

REVIEW OF SYSTEMS:  A 12-point review of systems was completed, all pertinent 
positives and negatives are as per HPI.

 

                               PHYSICAL EXAMINATION

 

GENERAL:  This is an elderly male, found sitting up in the stretcher in the 
emergency room.  He is alert, oriented, interactive, and does not appear to be 
in any acute distress.

 

VITAL SIGNS:  Temperature 97.9, heart rate 84, respiratory rate 20, blood 
pressure 118/63, and O2 saturation is 96% on room air.

 

HEENT:  Head is atraumatic, normocephalic.  Extraocular movements are intact. 
Pupils are equal, round and reactive to light.  Tympanic membranes are intact. 
Oral mucosa is moist.

 

NECK:  Supple.  No JVD is noted.

 

LUNGS:  Mildly diminished breath sounds, but there is fair aeration throughout 
all lung fields.  Expiratory crackles noted at the left lung base.

 

CARDIAC:  Regular rate and rhythm.  There is a grade 2/6 systolic ejection 
murmur that is best heard on the right upper sternal border.  No lower 
extremity edema.

 

ABDOMEN:  Soft, nontender, nondistended with normoactive bowel sounds.

 

MUSCULOSKELETAL:  There is no clubbing or cyanosis of the digits.  The patient 
is able to actively move all extremities.

 

SKIN:  Limited assessment, but appears grossly intact.

 

NEUROLOGIC:  Cranial nerves II through XII are grossly intact.  5/5 strength in 
upper and lower extremities.

 

PSYCH:  He is alert and oriented x3.  Affect is appropriate.

 

 DIAGNOSTIC STUDIES/LAB DATA:  CBC:  WBC 13.0, hemoglobin 11.1, hematocrit 34, 
platelet count 554.  CMP:  Sodium 135, potassium 4.5, chloride 96, BUN 58, 
creatinine 2.21, glucose 383, lactic acid 2.8, calcium 9.7.  Total bilirubin 0.4
, AST 32, , and alk phos 148.  Troponin 0.28.  CRP 52.19.  .  
Albumin 3.1.  Chest x-ray as per above.  EKG reviewed, shows sinus rhythm with 
PVCs.  Old medical records were reviewed.

 

ASSESSMENT AND PLAN:  This is an 87-year-old male, who presents today with 
shortness of breath with exertion, acute kidney injury, elevated troponin, and 
lactic acidosis as well as elevated WBC count after being recently treated for 
pneumonia.  He will be admitted under observation status to the telemetry 
floor. The plan is as follows:

 

1.  Dyspnea with exertion:  Currently, his O2 saturation is in the mid to high 
90s at rest.  We will follow this with patient exertion when on the floor.  
This may be residual dyspnea from his recently treated pneumonia, as well as 
his heart failure. I discussed with family that this is maybe a lingering 
symptoms that will improve with time; however, with a white count, I would like 
to make sure that this is not a treatment failure for his community-acquired 
pneumonia.  He is currently afebrile with a stable blood pressure and heart 
rate.  I do not feel that he needs antibiotics at this time; however, we will 
continue to closely monitor and should there be evidence of fever or worsening 
respiratory status, we will certainly include antibiotics with appropriate 
coverage.

2.  Acute kidney injury and dehydration.  The patient presents with an elevated 
creatinine that is worsened when he was discharged.  This is likely secondary 
to dehydration secondary to poor p.o. intake and recent diarrhea.  He has 
received 1 liter of IV fluid.  I am concerned that if we continue to give the 
patient fluid that he will likely develop pulmonary edema and so we will 
encourage p.o. fluids at this time and follow his labs tomorrow.

3.  Elevated troponin.  I suspect demand ischemia.  It is elevated from the 
previous admission.  We will continue to trend the troponins until they peak. 
He is currently denying chest pain.  EKG is similar in appearance to the 
previous.

4.  Diarrhea.  I suspect that this is secondary to antibiotic use; however, we 
will collect the sample and send for stool culture and further testing, given 
the elevated white count as well as the frequency and elevated CRP.  Encourage 
p.o. fluid intake.

5.  History of systolic heart failure.  This patient's presentation may also be 
related to his recent acute exacerbation.  He did have some weight loss at home 
and this is likely a combination of fluid and decreased p.o. intake.  We will 
hold his furosemide in the presence of dehydration, but this may be able to be 
restarted tomorrow depending on the patient's response to the fluid today and 
resuscitation efforts.  Resume when appropriate.

6.  History of hypertension.  Hold lisinopril in the presence of acute kidney 
injury, but continue metoprolol with hold parameters.

7.  Coronary artery disease.  Continue aspirin and statins.

8.  Type 2 diabetes.  He is ordered consistent carbohydrate, low sodium diet 
and will be covering with lispro sliding scale.  We will hold his glipizide.

9.  Lactic acidosis.  The patient has been resuscitated with fluid.  We will 
trend until resolution.

10.  Hyperlipidemia.  Continue statin.

11.  History of benign prostatic hyperplasia.  Continue Flomax.

12.  FEN.  As per above, the patient will be on consistent carbohydrate diet 
and has received 1 liter of fluid.

13.  DVT prophylaxis.  He is ordered subcu heparin.

14.  Code status:  Mr. Langston wishes to be a DNR and there is a MOLST on file 
from previous admission.

15.  Disposition.  Planned for observation for aforementioned conditions.  PT 
and OT have been ordered to evaluate to see if the patient is safe to go home.  
Planned for discharge to home with support systems in place unless if there is 
acute concern in which case, he may benefit from a subacute rehab.

 

TIME SPENT:  Approximately 60 minutes were spent on this admission with more 
than half the time was spent face-to-face with the patient and his family 
obtaining history and physical, performing physical examination, and reviewing 
the plan of care.  Plan of care was also reviewed with my attending, Dr. Magy Noel, who is in agreement.

 

 ____________________________________ 

RYANN GRUBER NP

 

[id number]/CPS #: [id number]

[facility]